# Patient Record
Sex: FEMALE | Race: WHITE | NOT HISPANIC OR LATINO | Employment: OTHER | URBAN - METROPOLITAN AREA
[De-identification: names, ages, dates, MRNs, and addresses within clinical notes are randomized per-mention and may not be internally consistent; named-entity substitution may affect disease eponyms.]

---

## 2018-03-30 ENCOUNTER — APPOINTMENT (OUTPATIENT)
Dept: WOMENS IMAGING | Facility: HOSPITAL | Age: 63
End: 2018-03-30

## 2018-03-30 PROCEDURE — 77067 SCR MAMMO BI INCL CAD: CPT | Performed by: RADIOLOGY

## 2021-01-15 ENCOUNTER — OFFICE VISIT (OUTPATIENT)
Dept: CARDIOLOGY | Facility: CLINIC | Age: 66
End: 2021-01-15

## 2021-01-15 VITALS
WEIGHT: 206 LBS | BODY MASS INDEX: 36.5 KG/M2 | DIASTOLIC BLOOD PRESSURE: 68 MMHG | SYSTOLIC BLOOD PRESSURE: 110 MMHG | TEMPERATURE: 97.7 F | HEIGHT: 63 IN | HEART RATE: 57 BPM | OXYGEN SATURATION: 97 %

## 2021-01-15 DIAGNOSIS — I47.29 VENTRICULAR TACHYCARDIA (PAROXYSMAL) (HCC): Primary | ICD-10-CM

## 2021-01-15 DIAGNOSIS — E78.5 HYPERLIPIDEMIA LDL GOAL <100: ICD-10-CM

## 2021-01-15 PROBLEM — I47.20 VENTRICULAR TACHYCARDIA (PAROXYSMAL): Status: ACTIVE | Noted: 2021-01-15

## 2021-01-15 PROCEDURE — 93000 ELECTROCARDIOGRAM COMPLETE: CPT | Performed by: INTERNAL MEDICINE

## 2021-01-15 PROCEDURE — 99214 OFFICE O/P EST MOD 30 MIN: CPT | Performed by: INTERNAL MEDICINE

## 2021-01-15 RX ORDER — METOPROLOL TARTRATE 50 MG/1
50 TABLET, FILM COATED ORAL 2 TIMES DAILY
Qty: 180 TABLET | Refills: 3 | Status: SHIPPED | OUTPATIENT
Start: 2021-01-15 | End: 2022-02-21

## 2021-01-15 NOTE — PROGRESS NOTES
[facility]  [facility] CARDIOLOGY  [address] [place] [address]  Dept: [phone]  Dept Fax: [phone]    Katie Nicolas  1955    Follow Up Office Visit Note    History of Present Illness:  Katie Nicolas is a 65 y.o. female who presents to the clinic for follow up of ventricular tachycardia - She is s/PLAN: ablation over 10 years ago, no complaints on Metoprolol 50 mg bid, no complaints    The following portions of the patient's history were reviewed and updated as appropriate: allergies, current medications, past family history, past medical history, past social history, past surgical history and problem list.    Medications:  ascorbic acid  aspirin  atorvastatin  Biotin capsule  calcium carbonate  Cholecalciferol  clonazePAM  folic acid  metoprolol tartrate  MILK THISTLE EXTRACT PO  omeprazole  Vitamin B Complex tablet  Xeljanz tablet    Subjective  Allergies   Allergen Reactions   • Niacin Nausea And Vomiting        Past Medical History:   Diagnosis Date   • Anxiety    • Benign essential hypertension    • Chest pain, unspecified    • Depressive disorder    • Diverticulosis     noted on CT scan for Kidney stones   • GERD without esophagitis    • Hyperlipidemia, unspecified    • Kidney stones     other episodes too   • Paroxysmal supraventricular tachycardia (CMS/HCC)     bypass tract ablation   • Rheumatoid arthritis (CMS/HCC)     meds   • Sleep apnea 2006    cpap   • Ventricular tachycardia (CMS/HCC)     The patient visits the office for an evaluationof ventricular tachcardia. Additional  information: No complaints, s/p ablation.       Past Surgical History:   Procedure Laterality Date   •  SECTION         Family History   Problem Relation Age of Onset   • Heart disease Father    • Obesity Other         Social History     Socioeconomic History   • Marital status: Single     Spouse name: Not on file   • Number of children: Not on file   • Years of education:  "Not on file   • Highest education level: Not on file   Tobacco Use   • Smoking status: Never Smoker   • Tobacco comment: UNKNOWN       Review of Systems   Constitutional: Negative.    HENT: Negative.    Respiratory: Negative.    Cardiovascular: Negative.    Endocrine: Negative.    Genitourinary: Negative.    Musculoskeletal: Negative.    Skin: Negative.    Allergic/Immunologic: Negative.    Neurological: Negative.    Hematological: Negative.    Psychiatric/Behavioral: Negative.        Cardiovascular Procedures    ECHO/MUGA: ECHOCARDIOGRAM - SCAN - ECHO-08- (01/13/2021)    STRESS TESTS:   CARDIAC CATH:   DEVICES:   HOLTER:   CT/MRI:   VASCULAR:   CARDIOTHORACIC:     Objective  Vitals:    01/15/21 1524   Pulse: 57   Temp: 97.7 °F (36.5 °C)   TempSrc: Infrared   SpO2: 97%   Weight: 93.4 kg (206 lb)   Height: 160 cm (63\")   PainSc: 0-No pain     Body mass index is 36.49 kg/m².     Physical Exam  Constitutional:       Appearance: Healthy appearance. Not in distress.   Neck:      Vascular: No JVR. JVD normal.   Pulmonary:      Effort: Pulmonary effort is normal.      Breath sounds: Normal breath sounds. No wheezing. No rhonchi. No rales.   Chest:      Chest wall: Not tender to palpatation.   Cardiovascular:      PMI at left midclavicular line. Normal rate. Regular rhythm. Normal S1. Normal S2.      Murmurs: There is no murmur.      No gallop. No click. No rub.   Pulses:     Intact distal pulses.   Edema:     Peripheral edema absent.   Abdominal:      General: Bowel sounds are normal.      Palpations: Abdomen is soft.      Tenderness: There is no abdominal tenderness.   Musculoskeletal: Normal range of motion.         General: No tenderness.   Skin:     General: Skin is warm and dry.   Neurological:      General: No focal deficit present.      Mental Status: Alert and oriented to person, place and time.          Diagnostic Data    ECG 12 Lead    Date/Time: 1/15/2021 3:45 PM  Performed by: Héctor Dumont, " MD  Authorized by: Héctor Dumont MD   Comparison: compared with previous ECG from 10/28/2019  Similar to previous ECG  Rhythm: sinus rhythm  Rate: normal  Conduction: right bundle branch block  QRS axis: normal              Assessment and Plan  Ventricular tachycardia - Asymptomatic on Metoprolol , she is s/p ablation for VT RVOT in 1999  Hyperlipidemia - was recently started on Lipitor     No follow-ups on file.    Héctor Dumont MD  01/15/2021

## 2021-03-22 ENCOUNTER — BULK ORDERING (OUTPATIENT)
Dept: CASE MANAGEMENT | Facility: OTHER | Age: 66
End: 2021-03-22

## 2021-03-22 DIAGNOSIS — Z23 IMMUNIZATION DUE: ICD-10-CM

## 2021-07-16 ENCOUNTER — OFFICE VISIT (OUTPATIENT)
Dept: CARDIOLOGY | Facility: CLINIC | Age: 66
End: 2021-07-16

## 2021-07-16 VITALS
DIASTOLIC BLOOD PRESSURE: 74 MMHG | HEART RATE: 88 BPM | RESPIRATION RATE: 11 BRPM | TEMPERATURE: 97 F | HEIGHT: 62 IN | WEIGHT: 211 LBS | OXYGEN SATURATION: 98 % | BODY MASS INDEX: 38.83 KG/M2 | SYSTOLIC BLOOD PRESSURE: 132 MMHG

## 2021-07-16 DIAGNOSIS — I47.29 VENTRICULAR TACHYCARDIA (PAROXYSMAL) (HCC): Primary | ICD-10-CM

## 2021-07-16 DIAGNOSIS — E78.5 HYPERLIPIDEMIA LDL GOAL <100: ICD-10-CM

## 2021-07-16 PROCEDURE — 93000 ELECTROCARDIOGRAM COMPLETE: CPT | Performed by: INTERNAL MEDICINE

## 2021-07-16 PROCEDURE — 99214 OFFICE O/P EST MOD 30 MIN: CPT | Performed by: INTERNAL MEDICINE

## 2021-07-16 RX ORDER — MELATONIN 200 MCG
TABLET ORAL
COMMUNITY

## 2021-07-16 RX ORDER — NAPROXEN 375 MG/1
375 TABLET ORAL DAILY
COMMUNITY
End: 2022-07-15

## 2021-07-16 NOTE — PROGRESS NOTES
[facility]  [facility] CARDIOLOGY  [address] [place] [address]  Dept: [phone]  Dept Fax: [phone]    Katie Nicolas  1955    Follow Up Office Visit Note    History of Present Illness:  Katie Nicolas is a 65 y.o. female who presents to the clinic for Follow-up. Ventricular tachycardia - She seems doing well, no complaints, s.p Ablation, on Metoprolol 50 mg bid,     The following portions of the patient's history were reviewed and updated as appropriate: allergies, current medications, past family history, past medical history, past social history, past surgical history and problem list.    Medications:  ascorbic acid  aspirin  atorvastatin  Biotin capsule  calcium carbonate  Cholecalciferol  clonazePAM  folic acid  Melatonin tablet  metoprolol tartrate  MILK THISTLE EXTRACT PO  naproxen  omeprazole  Vitamin B Complex tablet  Xeljanz tablet    Subjective  Allergies   Allergen Reactions   • Niacin Nausea And Vomiting        Past Medical History:   Diagnosis Date   • Anxiety    • Benign essential hypertension    • Chest pain, unspecified    • Depressive disorder    • Diverticulosis     noted on CT scan for Kidney stones   • GERD without esophagitis    • Hyperlipidemia, unspecified    • Kidney stones     other episodes too   • Paroxysmal supraventricular tachycardia (CMS/HCC)     bypass tract ablation   • Rheumatoid arthritis (CMS/HCC)     meds   • Sleep apnea     cpap   • Ventricular tachycardia (CMS/HCC)     The patient visits the office for an evaluationof ventricular tachcardia. Additional  information: No complaints, s/p ablation.       Past Surgical History:   Procedure Laterality Date   •  SECTION         Family History   Problem Relation Age of Onset   • Heart disease Father    • Obesity Other         Social History     Socioeconomic History   • Marital status: Single     Spouse name: Not on file   • Number of children: Not on file   • Years of  "education: Not on file   • Highest education level: Not on file   Tobacco Use   • Smoking status: Never Smoker   • Smokeless tobacco: Never Used   • Tobacco comment: UNKNOWN   Vaping Use   • Vaping Use: Never used   Substance and Sexual Activity   • Alcohol use: Not Currently   • Drug use: Never   • Sexual activity: Defer       Review of Systems   Constitutional: Negative.    HENT: Negative.    Respiratory: Negative.    Cardiovascular: Negative.    Endocrine: Negative.    Genitourinary: Negative.    Musculoskeletal: Negative.    Skin: Negative.    Allergic/Immunologic: Negative.    Neurological: Negative.    Hematological: Negative.    Psychiatric/Behavioral: Negative.    All other systems reviewed and are negative.      Cardiovascular Procedures    ECHO/MUGA:   STRESS TESTS:   CARDIAC CATH:   DEVICES:   HOLTER:   CT/MRI:   VASCULAR:   CARDIOTHORACIC:     Objective  Vitals:    07/16/21 1357   BP: 132/74   BP Location: Right arm   Patient Position: Sitting   Cuff Size: Adult   Pulse: 88   Resp: 11   Temp: 97 °F (36.1 °C)   TempSrc: Infrared   SpO2: 98%   Weight: 95.7 kg (211 lb)   Height: 157.5 cm (62\")   PainSc: 0-No pain     Body mass index is 38.59 kg/m².     Physical Exam  Constitutional:       Appearance: Healthy appearance. Not in distress.   Neck:      Vascular: No JVR. JVD normal.   Pulmonary:      Effort: Pulmonary effort is normal.      Breath sounds: Normal breath sounds. No wheezing. No rhonchi. No rales.   Chest:      Chest wall: Not tender to palpatation.   Cardiovascular:      PMI at left midclavicular line. Normal rate. Regular rhythm. Normal S1. Normal S2.      Murmurs: There is no murmur.      No gallop. No click. No rub.   Pulses:     Intact distal pulses.   Edema:     Peripheral edema absent.   Abdominal:      General: Bowel sounds are normal.      Palpations: Abdomen is soft.      Tenderness: There is no abdominal tenderness.   Musculoskeletal: Normal range of motion.         General: No " tenderness. Skin:     General: Skin is warm and dry.   Neurological:      General: No focal deficit present.      Mental Status: Alert and oriented to person, place and time.          Diagnostic Data    ECG 12 Lead    Date/Time: 7/16/2021 2:30 PM  Performed by: Héctor Dumont MD  Authorized by: Héctor Dumont MD   Comparison: compared with previous ECG   Similar to previous ECG  Rhythm: sinus rhythm  Rate: normal  BPM: 66  Conduction: right bundle branch block  QRS axis: normal    Clinical impression: abnormal EKG            Assessment and Plan  Diagnoses and all orders for this visit:    Ventricular tachycardia (paroxysmal) (CMS/HCC)-S.p ablation, no complaints on metoprolol 25 bid    Hyperlipidemia LDL goal <100 On Lipitor     Other orders  -     naproxen (NAPROSYN) 375 MG tablet; Take 375 mg by mouth 2 (Two) Times a Day As Needed for Mild Pain .  -     Melatonin 200 MCG tablet; Take  by mouth.         No follow-ups on file.    Héctor Dumont MD  07/16/2021

## 2022-01-11 DIAGNOSIS — I47.29 VENTRICULAR TACHYCARDIA (PAROXYSMAL): ICD-10-CM

## 2022-01-11 RX ORDER — METOPROLOL TARTRATE 50 MG/1
TABLET, FILM COATED ORAL
Qty: 180 TABLET | Refills: 3 | OUTPATIENT
Start: 2022-01-11

## 2022-02-20 DIAGNOSIS — I47.29 VENTRICULAR TACHYCARDIA (PAROXYSMAL): ICD-10-CM

## 2022-02-21 RX ORDER — METOPROLOL TARTRATE 50 MG/1
TABLET, FILM COATED ORAL
Qty: 180 TABLET | Refills: 1 | Status: SHIPPED | OUTPATIENT
Start: 2022-02-21 | End: 2022-08-19

## 2022-02-22 ENCOUNTER — TELEPHONE (OUTPATIENT)
Dept: CARDIOLOGY | Facility: CLINIC | Age: 67
End: 2022-02-22

## 2022-04-18 ENCOUNTER — TELEPHONE (OUTPATIENT)
Dept: CARDIOLOGY | Facility: CLINIC | Age: 67
End: 2022-04-18

## 2022-04-18 ENCOUNTER — OFFICE VISIT (OUTPATIENT)
Dept: CARDIOLOGY | Facility: CLINIC | Age: 67
End: 2022-04-18

## 2022-04-18 VITALS
HEART RATE: 62 BPM | DIASTOLIC BLOOD PRESSURE: 96 MMHG | HEIGHT: 62 IN | OXYGEN SATURATION: 98 % | TEMPERATURE: 96 F | RESPIRATION RATE: 18 BRPM | WEIGHT: 211 LBS | SYSTOLIC BLOOD PRESSURE: 160 MMHG | BODY MASS INDEX: 38.83 KG/M2

## 2022-04-18 DIAGNOSIS — I47.29 VENTRICULAR TACHYCARDIA (PAROXYSMAL): Primary | ICD-10-CM

## 2022-04-18 DIAGNOSIS — E78.5 HYPERLIPIDEMIA LDL GOAL <100: ICD-10-CM

## 2022-04-18 DIAGNOSIS — R07.9 CHRONIC CHEST PAIN WITH LOW TO MODERATE RISK FOR CAD: ICD-10-CM

## 2022-04-18 DIAGNOSIS — G89.29 CHRONIC CHEST PAIN WITH LOW TO MODERATE RISK FOR CAD: ICD-10-CM

## 2022-04-18 DIAGNOSIS — I10 ESSENTIAL HYPERTENSION: ICD-10-CM

## 2022-04-18 DIAGNOSIS — Z91.89 CHRONIC CHEST PAIN WITH LOW TO MODERATE RISK FOR CAD: ICD-10-CM

## 2022-04-18 PROCEDURE — 99214 OFFICE O/P EST MOD 30 MIN: CPT | Performed by: INTERNAL MEDICINE

## 2022-04-18 RX ORDER — VALSARTAN 160 MG/1
160 TABLET ORAL DAILY
Qty: 30 TABLET | Refills: 2 | Status: SHIPPED | OUTPATIENT
Start: 2022-04-18 | End: 2022-05-02 | Stop reason: SDUPTHER

## 2022-04-18 NOTE — PROGRESS NOTES
[facility]  [facility] CARDIOLOGY  [address] [place] [address]  Dept: [phone]  Dept Fax: [phone]    Katie Nicolas  1955    Follow Up Office Visit Note    History of Present Illness:  Katie Nicolas is a 66 y.o. female who presents to the clinic for Follow-up. Chest pain AND Hypertension- Few days ago woke up and started having chest pain, retrosternal, dull , she check her BP and was elevated went to ER, EKG      Sinus no changes, Troponin negative, her BP was elevated,,  She was discharged from ER to follow up with us, she is not longer having any chest pain, , EKG sinus with  RBBB and LASHB, no changes,, her BP today is 145,80 , she takes Metoprolol for BP and also for VT, which she has had ablation times 2, in the 80 and also in the 90. Her cardiac exam is normal, will get a stress nuclear  To look for ischemia, , will add Valsartan 160 mg , will see her back in 2 weeks    The following portions of the patient's history were reviewed and updated as appropriate: allergies, current medications, past family history, past medical history, past surgical history and problem list.    Medications:  ascorbic acid  aspirin  atorvastatin  Biotin capsule  calcium carbonate  Cholecalciferol  clonazePAM  Melatonin tablet  metoprolol tartrate  naproxen  omeprazole  valsartan  Vitamin B Complex tablet  Xeljanz tablet    Subjective  Allergies   Allergen Reactions   • Niacin Nausea And Vomiting        Past Medical History:   Diagnosis Date   • Anxiety    • Benign essential hypertension    • Chest pain, unspecified    • Depressive disorder    • Diverticulosis 2005    noted on CT scan for Kidney stones   • GERD without esophagitis    • Hyperlipidemia, unspecified    • Kidney stones 2005    other episodes too   • Paroxysmal supraventricular tachycardia (HCC)     bypass tract ablation   • Rheumatoid arthritis (HCC)     meds   • Sleep apnea 2006    cpap   • Ventricular tachycardia  "([facility])     The patient visits the office for an evaluationof ventricular tachcardia. Additional  information: No complaints, s/p ablation.       Past Surgical History:   Procedure Laterality Date   •  SECTION         Family History   Problem Relation Age of Onset   • Heart disease Father    • Obesity Other         Social History     Socioeconomic History   • Marital status: Single   Tobacco Use   • Smoking status: Never Smoker   • Smokeless tobacco: Never Used   • Tobacco comment: UNKNOWN   Vaping Use   • Vaping Use: Never used   Substance and Sexual Activity   • Alcohol use: Not Currently   • Drug use: Never   • Sexual activity: Defer       Review of Systems   Constitutional: Negative.    HENT: Negative.    Respiratory: Negative.    Cardiovascular: Positive for chest pain.   Endocrine: Negative.    Genitourinary: Negative.    Musculoskeletal: Negative.    Skin: Negative.    Allergic/Immunologic: Negative.    Neurological: Negative.    Hematological: Negative.    Psychiatric/Behavioral: Negative.        Cardiovascular Procedures    ECHO/MUGA:   STRESS TESTS:   CARDIAC CATH:   DEVICES:   HOLTER:   CT/MRI:   VASCULAR:   CARDIOTHORACIC:     Objective  Vitals:    22 1442   BP: 160/96   BP Location: Left arm   Patient Position: Lying   Cuff Size: Adult   Pulse: 62   Resp: 18   Temp: 96 °F (35.6 °C)   TempSrc: Temporal   SpO2: 98%   Weight: 95.7 kg (211 lb)   Height: 157.5 cm (62\")   PainSc: 0-No pain     Body mass index is 38.59 kg/m².     Physical Exam  Constitutional:       Appearance: Healthy appearance. Not in distress.   Neck:      Vascular: No JVR. JVD normal.   Pulmonary:      Effort: Pulmonary effort is normal.      Breath sounds: Normal breath sounds. No wheezing. No rhonchi. No rales.   Chest:      Chest wall: Not tender to palpatation.   Cardiovascular:      PMI at left midclavicular line. Normal rate. Regular rhythm. Normal S1. Normal S2.      Murmurs: There is no murmur.      No gallop. No " click. No rub.   Pulses:     Intact distal pulses.   Edema:     Peripheral edema absent.   Abdominal:      General: Bowel sounds are normal.      Palpations: Abdomen is soft.      Tenderness: There is no abdominal tenderness.   Musculoskeletal: Normal range of motion.         General: No tenderness. Skin:     General: Skin is warm and dry.   Neurological:      General: No focal deficit present.      Mental Status: Alert and oriented to person, place and time.          Diagnostic Data  Procedures    Assessment and Plan  Diagnoses and all orders for this visit:    Ventricular tachycardia (paroxysmal) (HCC)- Has been asymptomatic for years, on Metoprolol 50 mg bid after times 2 ablation  -     Stress Test With Myocardial Perfusion One Day; Future    Hyperlipidemia LDL goal <100- on Lipitor, we need a Lipid profile    Essential hypertension- BP is 145.890, on Metoprolol 50 mg bid, will add Valsartan 160 mg    Chronic chest pain with low to moderate risk for CAD- She is 66, no smoker, no diabetes, got chest pain for first time with BP elevated, will get a treadmill Nuclear    Other orders  -     valsartan (DIOVAN) 160 MG tablet; Take 1 tablet by mouth Daily.         Return in about 2 weeks (around 5/2/2022).    Héctor Dumont MD  04/18/2022

## 2022-04-18 NOTE — TELEPHONE ENCOUNTER
Patient called because [facility] told her to make Dr. Dumont aware she had a cardiac event; bp 198/111 . An appointment was made in error for Jan because the follow up was meant to be 1 year from July's visit, so she's compliant. She is coming in for bp check, and I'm requesting records right now from [facility]. STAT RECORDS ARE BEING FAXED NOW.

## 2022-04-25 ENCOUNTER — OFFICE VISIT (OUTPATIENT)
Dept: FAMILY MEDICINE CLINIC | Facility: CLINIC | Age: 67
End: 2022-04-25

## 2022-04-25 VITALS
SYSTOLIC BLOOD PRESSURE: 126 MMHG | WEIGHT: 203 LBS | BODY MASS INDEX: 37.36 KG/M2 | OXYGEN SATURATION: 96 % | HEIGHT: 62 IN | DIASTOLIC BLOOD PRESSURE: 80 MMHG | HEART RATE: 74 BPM

## 2022-04-25 DIAGNOSIS — F41.9 ANXIETY: ICD-10-CM

## 2022-04-25 DIAGNOSIS — I10 ESSENTIAL HYPERTENSION: Primary | ICD-10-CM

## 2022-04-25 DIAGNOSIS — R21 RASH AND NONSPECIFIC SKIN ERUPTION: ICD-10-CM

## 2022-04-25 PROCEDURE — 99214 OFFICE O/P EST MOD 30 MIN: CPT | Performed by: FAMILY MEDICINE

## 2022-04-25 RX ORDER — CLONAZEPAM 1 MG/1
1 TABLET ORAL 2 TIMES DAILY PRN
Qty: 60 TABLET | Refills: 2 | Status: SHIPPED | OUTPATIENT
Start: 2022-04-25 | End: 2022-08-01 | Stop reason: SDUPTHER

## 2022-04-25 NOTE — PROGRESS NOTES
Follow Up Office Visit      Date of Visit:  2022   Patient Name: Katie Nicolas  : 1955   MRN: [id number]     Chief Complaint:    Chief Complaint   Patient presents with   • Hypertension     ER visit due to hypertension, has seen Dr Dumont since the ER visit   • Med Refill       History of Present Illness: Katie Nicolas is a 66 y.o. female who is here today for follow up.    Hypertension  This is a chronic problem. The problem has been waxing and waning since onset. The problem is uncontrolled. Associated symptoms include anxiety. Pertinent negatives include no chest pain or shortness of breath. Risk factors for coronary artery disease include sedentary lifestyle, dyslipidemia and obesity. Past treatments include angiotensin blockers and beta blockers. The current treatment provides mild improvement. Compliance problems include psychosocial issues.    Anxiety  Presents for follow-up visit. Patient reports no chest pain, depressed mood, nausea or shortness of breath. Symptoms occur constantly. The severity of symptoms is moderate. The quality of sleep is good.     Compliance with medications is %.         Subjective      Review of Systems:   Review of Systems   Constitutional: Negative for fatigue and fever.   HENT: Negative for congestion and ear pain.    Respiratory: Negative for apnea, cough, chest tightness and shortness of breath.    Cardiovascular: Negative for chest pain.   Gastrointestinal: Negative for abdominal pain, constipation, diarrhea and nausea.   Musculoskeletal: Negative for arthralgias.   Psychiatric/Behavioral: Negative for depressed mood and stress.       Past Medical History:   Past Medical History:   Diagnosis Date   • Anxiety    • Benign essential hypertension    • Chest pain, unspecified    • Depressive disorder    • Diverticulosis     noted on CT scan for Kidney stones   • GERD without esophagitis    • Hyperlipidemia, unspecified    • Kidney stones      other episodes too   • Paroxysmal supraventricular tachycardia (HCC)     bypass tract ablation   • Rheumatoid arthritis (HCC)     meds   • Sleep apnea 2006    cpap   • Ventricular tachycardia (HCC)     The patient visits the office for an evaluationof ventricular tachcardia. Additional  information: No complaints, s/p ablation.       Past Surgical History:   Past Surgical History:   Procedure Laterality Date   •  SECTION         Family History:   Family History   Problem Relation Age of Onset   • Heart disease Father    • Obesity Other        Social History:   Social History     Socioeconomic History   • Marital status: Single   Tobacco Use   • Smoking status: Never Smoker   • Smokeless tobacco: Never Used   • Tobacco comment: UNKNOWN   Vaping Use   • Vaping Use: Never used   Substance and Sexual Activity   • Alcohol use: Not Currently   • Drug use: Never   • Sexual activity: Defer       Medications:     Current Outpatient Medications:   •  aspirin 81 MG EC tablet, Take 81 mg by mouth Daily. Take one tablet by oral route daily, Disp: , Rfl:   •  atorvastatin (LIPITOR) 10 MG tablet, Take 10 mg by mouth Daily. Take one tablet by oral route daily, Disp: , Rfl:   •  B Complex Vitamins (Vitamin B Complex) tablet, Take  by mouth Daily. Take one tablet by oral route daily, Disp: , Rfl:   •  Biotin 10 MG capsule, Take  by mouth. Take one tablet by oral route daily, Disp: , Rfl:   •  calcium carbonate (OS-RYANNE) 1250 (500 Ca) MG tablet, Take 1 tablet by mouth Daily. Take one tablet by oral route daily;, Disp: , Rfl:   •  clonazePAM (KlonoPIN) 1 MG tablet, Take 1 mg by mouth 2 (Two) Times a Day As Needed. Take two tablets by oral route daily at bedtime, Disp: , Rfl:   •  Melatonin 200 MCG tablet, Take  by mouth., Disp: , Rfl:   •  metoprolol tartrate (LOPRESSOR) 50 MG tablet, TAKE ONE TABLET BY MOUTH TWICE A DAY, Disp: 180 tablet, Rfl: 1  •  MILK THISTLE PO, Take  by mouth., Disp: , Rfl:   •  naproxen (NAPROSYN) 375 MG  "tablet, Take 375 mg by mouth Daily., Disp: , Rfl:   •  omeprazole (priLOSEC) 40 MG capsule, Take 40 mg by mouth Daily. DAILY BEFORE A SINGLE MEAL, Disp: , Rfl:   •  Tofacitinib Citrate (Xeljanz) 5 MG tablet, Take 11 mg by mouth Daily., Disp: , Rfl:   •  valsartan (DIOVAN) 160 MG tablet, Take 1 tablet by mouth Daily., Disp: 30 tablet, Rfl: 2  •  ascorbic acid (VITAMIN C) 500 MG tablet, Take 500 mg by mouth Daily. Take one tablet by oral route daily, Disp: , Rfl:   •  Cholecalciferol 25 MCG (1000 UT) tablet, Take 1,000 Units by mouth Daily. Take one tablet by oral route daily, Disp: , Rfl:     Allergies:   Allergies   Allergen Reactions   • Niacin Nausea And Vomiting       Objective     Physical Exam:  Vital Signs:   Vitals:    04/25/22 1315   BP: 126/80   Pulse: 74   SpO2: 96%   Weight: 92.1 kg (203 lb)   Height: 157.5 cm (62\")     Body mass index is 37.13 kg/m².     Physical Exam  Vitals and nursing note reviewed.   Constitutional:       General: She is not in acute distress.     Appearance: Normal appearance. She is not ill-appearing.   HENT:      Head: Normocephalic and atraumatic.      Right Ear: Tympanic membrane and ear canal normal.      Left Ear: Tympanic membrane and ear canal normal.      Nose: Nose normal.   Cardiovascular:      Rate and Rhythm: Normal rate and regular rhythm.      Heart sounds: Normal heart sounds.   Pulmonary:      Effort: Pulmonary effort is normal.      Breath sounds: Normal breath sounds.   Neurological:      Mental Status: She is alert and oriented to person, place, and time. Mental status is at baseline.   Psychiatric:         Mood and Affect: Mood normal.         Procedures    BMI is above normal parameters. Recommendations: exercise counseling/recommendations and nutrition counseling/recommendations        Assessment / Plan      Assessment/Plan:   Diagnoses and all orders for this visit:    1. Essential hypertension (Primary)    2. Anxiety    3. Rash and nonspecific skin " eruption  -     Ambulatory Referral to Dermatology         Refill anxiety med.   edin report reviewed.  Will cont meds from cardiology and they can adjust meds for the HTN.   Stress test looks look.   Will also make appt for derm for some nonspecific skin lesions on her legs.      Follow Up:   Return in about 3 months (around 7/25/2022) for Recheck, Annual physical, Annual Wellness-Provider.    Kalen Leslie  [facility]

## 2022-05-02 ENCOUNTER — OFFICE VISIT (OUTPATIENT)
Dept: CARDIOLOGY | Facility: CLINIC | Age: 67
End: 2022-05-02

## 2022-05-02 VITALS
RESPIRATION RATE: 18 BRPM | TEMPERATURE: 98 F | BODY MASS INDEX: 38.09 KG/M2 | SYSTOLIC BLOOD PRESSURE: 166 MMHG | HEIGHT: 62 IN | OXYGEN SATURATION: 98 % | HEART RATE: 84 BPM | DIASTOLIC BLOOD PRESSURE: 86 MMHG | WEIGHT: 207 LBS

## 2022-05-02 DIAGNOSIS — I10 ESSENTIAL HYPERTENSION: ICD-10-CM

## 2022-05-02 DIAGNOSIS — Z91.89 CHRONIC CHEST PAIN WITH LOW TO MODERATE RISK FOR CAD: ICD-10-CM

## 2022-05-02 DIAGNOSIS — I47.29 VENTRICULAR TACHYCARDIA (PAROXYSMAL): Primary | ICD-10-CM

## 2022-05-02 DIAGNOSIS — E78.5 HYPERLIPIDEMIA LDL GOAL <100: ICD-10-CM

## 2022-05-02 DIAGNOSIS — G89.29 CHRONIC CHEST PAIN WITH LOW TO MODERATE RISK FOR CAD: ICD-10-CM

## 2022-05-02 DIAGNOSIS — R07.9 CHRONIC CHEST PAIN WITH LOW TO MODERATE RISK FOR CAD: ICD-10-CM

## 2022-05-02 PROCEDURE — 99214 OFFICE O/P EST MOD 30 MIN: CPT | Performed by: INTERNAL MEDICINE

## 2022-05-02 RX ORDER — VALSARTAN 320 MG/1
320 TABLET ORAL DAILY
Qty: 90 TABLET | Refills: 2 | Status: SHIPPED | OUTPATIENT
Start: 2022-05-02 | End: 2023-01-18 | Stop reason: SDUPTHER

## 2022-05-02 NOTE — PROGRESS NOTES
[facility] [place]  [facility] CARDIOLOGY  [address] [place] [address]  Dept: [phone]  Dept Fax: [phone]    Katie Nicolas  1955    Follow Up Office Visit Note    History of Present Illness:  Katie Nicolas is a 66 y.o. female who presents to the clinic for Follow-up. Chest pain- No longer has chest pain, stress nuclear normal . , will observe    The following portions of the patient's history were reviewed and updated as appropriate: allergies, current medications, past family history, past medical history, past social history, past surgical history and problem list.    Medications:  ascorbic acid  aspirin  atorvastatin  Biotin capsule  calcium carbonate  Cholecalciferol  clonazePAM  Melatonin tablet  metoprolol tartrate  MILK THISTLE PO  naproxen  omeprazole  valsartan  Vitamin B Complex tablet  Xeljanz tablet    Subjective  Allergies   Allergen Reactions   • Niacin Nausea And Vomiting        Past Medical History:   Diagnosis Date   • Anxiety    • Benign essential hypertension    • Chest pain, unspecified    • Depressive disorder    • Diverticulosis     noted on CT scan for Kidney stones   • GERD without esophagitis    • Hyperlipidemia, unspecified    • Kidney stones     other episodes too   • Paroxysmal supraventricular tachycardia (HCC)     bypass tract ablation   • Rheumatoid arthritis (HCC)     meds   • Sleep apnea 2006    cpap   • Ventricular tachycardia (HCC)     The patient visits the office for an evaluationof ventricular tachcardia. Additional  information: No complaints, s/p ablation.       Past Surgical History:   Procedure Laterality Date   •  SECTION         Family History   Problem Relation Age of Onset   • Heart disease Father    • Obesity Other         Social History     Socioeconomic History   • Marital status: Single   Tobacco Use   • Smoking status: Never Smoker   • Smokeless tobacco: Never Used   • Tobacco comment: UNKNOWN   Vaping  "Use   • Vaping Use: Never used   Substance and Sexual Activity   • Alcohol use: Not Currently   • Drug use: Never   • Sexual activity: Defer       Review of Systems   Constitutional: Negative.    HENT: Negative.    Respiratory: Negative.    Cardiovascular: Negative.    Endocrine: Negative.    Genitourinary: Negative.    Musculoskeletal: Negative.    Skin: Negative.    Allergic/Immunologic: Negative.    Neurological: Negative.    Hematological: Negative.    Psychiatric/Behavioral: Negative.        Cardiovascular Procedures    ECHO/MUGA:   STRESS TESTS:   CARDIAC CATH:   DEVICES:   HOLTER:   CT/MRI:   VASCULAR:   CARDIOTHORACIC:     Objective  Vitals:    05/02/22 1328   BP: 166/86   BP Location: Left arm   Patient Position: Sitting   Cuff Size: Adult   Pulse: 84   Resp: 18   Temp: 98 °F (36.7 °C)   TempSrc: Infrared   SpO2: 98%   Weight: 93.9 kg (207 lb)   Height: 157.5 cm (62\")   PainSc: 0-No pain     Body mass index is 37.86 kg/m².     Physical Exam  Constitutional:       Appearance: Healthy appearance. Not in distress.   Neck:      Vascular: No JVR. JVD normal.   Pulmonary:      Effort: Pulmonary effort is normal.      Breath sounds: Normal breath sounds. No wheezing. No rhonchi. No rales.   Chest:      Chest wall: Not tender to palpatation.   Cardiovascular:      PMI at left midclavicular line. Normal rate. Regular rhythm. Normal S1. Normal S2.      Murmurs: There is no murmur.      No gallop. No click. No rub.   Pulses:     Intact distal pulses.   Edema:     Peripheral edema absent.   Abdominal:      General: Bowel sounds are normal.      Palpations: Abdomen is soft.      Tenderness: There is no abdominal tenderness.   Musculoskeletal: Normal range of motion.         General: No tenderness. Skin:     General: Skin is warm and dry.   Neurological:      General: No focal deficit present.      Mental Status: Alert and oriented to person, place and time.          Diagnostic Data  Procedures    Assessment and " Plan  Diagnoses and all orders for this visit:    Ventricular tachycardia (paroxysmal) (HCC)- s.p ablation., no complaints    Hyperlipidemia LDL goal <100- On Lipitor 10 mg    Essential hypertension- the Bp is still high 145.80 better, but high, after we add Valsartan 160 mg to Metoprolol, will increase furter to 320 mg, will let us know if the SBP is still higher than 140 in 2 weeks      Chronic chest pain with low to moderate risk for CAD- Stress negative, no longer has chest pain    Other orders  -     valsartan (DIOVAN) 320 MG tablet; Take 1 tablet by mouth Daily.         Return in about 3 months (around 8/2/2022) for Recheck.    Héctor Dumont MD  05/02/2022

## 2022-05-11 ENCOUNTER — OFFICE VISIT (OUTPATIENT)
Dept: FAMILY MEDICINE CLINIC | Facility: CLINIC | Age: 67
End: 2022-05-11

## 2022-05-11 VITALS
BODY MASS INDEX: 36.14 KG/M2 | SYSTOLIC BLOOD PRESSURE: 138 MMHG | HEART RATE: 78 BPM | OXYGEN SATURATION: 98 % | WEIGHT: 204 LBS | TEMPERATURE: 97.6 F | HEIGHT: 63 IN | DIASTOLIC BLOOD PRESSURE: 84 MMHG

## 2022-05-11 DIAGNOSIS — R05.9 COUGH: Primary | ICD-10-CM

## 2022-05-11 DIAGNOSIS — K21.9 GASTROESOPHAGEAL REFLUX DISEASE, UNSPECIFIED WHETHER ESOPHAGITIS PRESENT: ICD-10-CM

## 2022-05-11 PROCEDURE — 99214 OFFICE O/P EST MOD 30 MIN: CPT | Performed by: PHYSICIAN ASSISTANT

## 2022-05-11 NOTE — PROGRESS NOTES
"Chief Complaint  Wheezing and Heartburn (C/o heartburn and nausea. )    Subjective          Katie Nicolas presents to [facility]  Patient in today for eval on mild wheeze that her rheumatologist heard yesterday. She states has had occasional cough and keeps her normal allergy/sinus congestion- nothing out of the ordinary for her. Denies any fever. No vomiting or diarrhea. She has had more noticeable reflux symptoms for past 3+ weeks. She went to ER 2 weeks ago for chest pain- states acute cardiac workup was negative and has since f/up with her cardiologist and had stress test done.  She currently denies any chest pain or shortness of breath. States her bp has been improving over past few weeks since medication adjustments were made.     Wheezing   This is a new problem. The current episode started yesterday. Associated symptoms include coughing. Pertinent negatives include no abdominal pain, chest pain, diarrhea, shortness of breath, sore throat or vomiting. There is no history of asthma.   Heartburn  She complains of coughing and wheezing. She reports no abdominal pain, no chest pain, no nausea or no sore throat. Pertinent negatives include no fatigue.       Objective   Vital Signs:   /98   Pulse 78   Temp 97.6 °F (36.4 °C) (Oral)   Ht 158.8 cm (62.5\")   Wt 92.5 kg (204 lb)   SpO2 98%   BMI 36.72 kg/m²     Body mass index is 36.72 kg/m².    Review of Systems   Constitutional: Negative for fatigue.   HENT: Positive for congestion. Negative for sore throat.    Respiratory: Positive for cough and wheezing. Negative for shortness of breath.    Cardiovascular: Negative for chest pain, palpitations and leg swelling.   Gastrointestinal: Negative for abdominal pain, diarrhea, nausea and vomiting.   Neurological: Negative for dizziness and headache.       Past History:  Medical History: has a past medical history of Anxiety, Benign essential hypertension, Chest pain, unspecified, " Depressive disorder, Diverticulosis (), GERD without esophagitis, Hyperlipidemia, unspecified, Kidney stones (), Paroxysmal supraventricular tachycardia (HCC), Rheumatoid arthritis (HCC), Sleep apnea (2006), and Ventricular tachycardia (HCC).   Surgical History: has a past surgical history that includes  section.   Family History: family history includes Heart disease in her father; Obesity in an other family member.   Social History: reports that she has never smoked. She has never used smokeless tobacco. She reports current alcohol use. She reports that she does not use drugs.      Current Outpatient Medications:   •  ascorbic acid (VITAMIN C) 500 MG tablet, Take 500 mg by mouth Daily. Take one tablet by oral route daily, Disp: , Rfl:   •  aspirin 81 MG EC tablet, Take 81 mg by mouth Daily. Take one tablet by oral route daily, Disp: , Rfl:   •  atorvastatin (LIPITOR) 10 MG tablet, Take 10 mg by mouth Daily. Take one tablet by oral route daily, Disp: , Rfl:   •  B Complex Vitamins (Vitamin B Complex) tablet, Take  by mouth Daily. Take one tablet by oral route daily, Disp: , Rfl:   •  Biotin 10 MG capsule, Take  by mouth. Take one tablet by oral route daily, Disp: , Rfl:   •  calcium carbonate (OS-RYANNE) 1250 (500 Ca) MG tablet, Take 1 tablet by mouth Daily. Take one tablet by oral route daily;, Disp: , Rfl:   •  Cholecalciferol 25 MCG (1000 UT) tablet, Take 1,000 Units by mouth Daily. Take one tablet by oral route daily, Disp: , Rfl:   •  clonazePAM (KlonoPIN) 1 MG tablet, Take 1 tablet by mouth 2 (Two) Times a Day As Needed for Anxiety. Take two tablets by oral route daily at bedtime, Disp: 60 tablet, Rfl: 2  •  Melatonin 200 MCG tablet, Take  by mouth., Disp: , Rfl:   •  metoprolol tartrate (LOPRESSOR) 50 MG tablet, TAKE ONE TABLET BY MOUTH TWICE A DAY, Disp: 180 tablet, Rfl: 1  •  MILK THISTLE PO, Take  by mouth., Disp: , Rfl:   •  naproxen (NAPROSYN) 375 MG tablet, Take 375 mg by mouth Daily.,  Disp: , Rfl:   •  omeprazole (priLOSEC) 40 MG capsule, Take 40 mg by mouth Daily. DAILY BEFORE A SINGLE MEAL, Disp: , Rfl:   •  Tofacitinib Citrate (Xeljanz) 5 MG tablet, Take 11 mg by mouth Daily., Disp: , Rfl:   •  valsartan (DIOVAN) 320 MG tablet, Take 1 tablet by mouth Daily., Disp: 90 tablet, Rfl: 2  Allergies: Niacin    Physical Exam  Constitutional:       Appearance: Normal appearance.   HENT:      Right Ear: Tympanic membrane normal.      Left Ear: Tympanic membrane normal.      Mouth/Throat:      Pharynx: Oropharynx is clear.   Eyes:      Conjunctiva/sclera: Conjunctivae normal.      Pupils: Pupils are equal, round, and reactive to light.   Cardiovascular:      Rate and Rhythm: Normal rate and regular rhythm.      Heart sounds: Normal heart sounds.   Pulmonary:      Effort: Pulmonary effort is normal. No respiratory distress.      Breath sounds: No rhonchi.      Comments: Faint exp wheeze heard once.   Abdominal:      Palpations: Abdomen is soft.      Tenderness: There is no abdominal tenderness.   Neurological:      Mental Status: She is oriented to person, place, and time.   Psychiatric:         Mood and Affect: Mood normal.         Behavior: Behavior normal.             Assessment and Plan   Diagnoses and all orders for this visit:    1. Cough (Primary)  -     XR Chest PA & Lateral (In Office)  Minimal faint wheeze heard once  on expiration.  Will check chest x-ray today.Discussed if symptoms persist, can send an inhaler to use as needed.  If notices any progression of symptoms return to clinic or ER if needed.  2. Gastroesophageal reflux disease, unspecified whether esophagitis present  With persistent and progressive complaints of reflux, recommend to get in with GI for further evaluation.  Patient states will consider and call back to get appointment scheduled.  Continue current medication and avoid aggravating foods and beverages.          Follow Up   Return if symptoms worsen or fail to  improve.  Patient was given instructions and counseling regarding her condition or for health maintenance advice. Please see specific information pulled into the AVS if appropriate.     Jessica Ryan PA-C

## 2022-05-13 RX ORDER — OMEPRAZOLE 40 MG/1
40 CAPSULE, DELAYED RELEASE ORAL DAILY
Qty: 90 CAPSULE | Refills: 0 | Status: SHIPPED | OUTPATIENT
Start: 2022-05-13 | End: 2022-08-09

## 2022-05-13 NOTE — TELEPHONE ENCOUNTER
Patient called requesting a medication refill on her prescription for omeprazole 40 MG. She would like a phone call back letting her know this request has been fulfilled ([phone]). Please advise.     The pharmacy is [facility] on [address] in [place].

## 2022-05-16 ENCOUNTER — TELEPHONE (OUTPATIENT)
Dept: FAMILY MEDICINE CLINIC | Facility: CLINIC | Age: 67
End: 2022-05-16

## 2022-05-16 NOTE — TELEPHONE ENCOUNTER
Please let know chest x-ray showed her lungs were clear.  Radiologist  incidentally  noted some multiple old right rib fractures.  Please let us know if symptoms persist.

## 2022-06-10 ENCOUNTER — TELEPHONE (OUTPATIENT)
Dept: CARDIOLOGY | Facility: CLINIC | Age: 67
End: 2022-06-10

## 2022-06-10 ENCOUNTER — TELEPHONE (OUTPATIENT)
Dept: FAMILY MEDICINE CLINIC | Facility: CLINIC | Age: 67
End: 2022-06-10

## 2022-06-10 ENCOUNTER — DOCUMENTATION (OUTPATIENT)
Dept: CARDIOLOGY | Facility: CLINIC | Age: 67
End: 2022-06-10

## 2022-06-10 RX ORDER — AMLODIPINE BESYLATE 5 MG/1
5 TABLET ORAL DAILY
Qty: 30 TABLET | Refills: 1 | Status: SHIPPED | OUTPATIENT
Start: 2022-06-10 | End: 2022-07-18

## 2022-06-10 RX ORDER — AMLODIPINE BESYLATE 5 MG/1
5 TABLET ORAL DAILY
Qty: 90 TABLET | Refills: 1 | Status: SHIPPED | OUTPATIENT
Start: 2022-06-10 | End: 2022-07-18 | Stop reason: ALTCHOICE

## 2022-06-10 RX ORDER — AMLODIPINE BESYLATE 5 MG/1
5 TABLET ORAL DAILY
COMMUNITY
End: 2022-06-10 | Stop reason: SDUPTHER

## 2022-06-10 NOTE — TELEPHONE ENCOUNTER
----- Message from Héctor Dumont MD sent at 6/10/2022  2:07 PM EDT -----  Regarding: BP  Please add Amlodipine 5mg daily  ----- Message -----  From: Madonna Mendez MA  Sent: 6/10/2022   1:26 PM EDT  To: Héctor Dumont MD

## 2022-06-10 NOTE — TELEPHONE ENCOUNTER
Left a message for Ms. Nicolas that Dr. Dumont looked at her BP reading and would like her to start Amlodipine medication for her BP.  You will take a 5mg tablet daily. Please keep us informed of how your BP is doing.

## 2022-06-10 NOTE — TELEPHONE ENCOUNTER
----- Message from Jessica Ryan PA-C sent at 5/11/2022 10:43 AM EDT -----  Please see if can get hospital records from [facility] from  2 weeks ago. Thanks .

## 2022-06-10 NOTE — TELEPHONE ENCOUNTER
Called pt and let her know Dr. Dumont wanted her to add Amlodipine 5mg once daily to her current medication regimen, I also advised her to continue to monitor her bp until her next appointment 7/18 pt verbally understood

## 2022-06-29 ENCOUNTER — TELEPHONE (OUTPATIENT)
Dept: CARDIOLOGY | Facility: CLINIC | Age: 67
End: 2022-06-29

## 2022-07-01 RX ORDER — ATORVASTATIN CALCIUM 10 MG/1
TABLET, FILM COATED ORAL
Qty: 90 TABLET | Refills: 0 | Status: SHIPPED | OUTPATIENT
Start: 2022-07-01 | End: 2022-09-30

## 2022-07-13 RX ORDER — VALSARTAN 160 MG/1
TABLET ORAL
Qty: 90 TABLET | Refills: 0 | OUTPATIENT
Start: 2022-07-13

## 2022-07-15 RX ORDER — NAPROXEN 375 MG/1
TABLET ORAL
Qty: 180 TABLET | Refills: 0 | Status: SHIPPED | OUTPATIENT
Start: 2022-07-15 | End: 2023-01-03

## 2022-07-18 ENCOUNTER — OFFICE VISIT (OUTPATIENT)
Dept: CARDIOLOGY | Facility: CLINIC | Age: 67
End: 2022-07-18

## 2022-07-18 VITALS
OXYGEN SATURATION: 99 % | DIASTOLIC BLOOD PRESSURE: 76 MMHG | BODY MASS INDEX: 35.26 KG/M2 | TEMPERATURE: 98 F | RESPIRATION RATE: 18 BRPM | HEART RATE: 65 BPM | SYSTOLIC BLOOD PRESSURE: 132 MMHG | WEIGHT: 199 LBS | HEIGHT: 63 IN

## 2022-07-18 DIAGNOSIS — G89.29 CHRONIC CHEST PAIN WITH LOW TO MODERATE RISK FOR CAD: ICD-10-CM

## 2022-07-18 DIAGNOSIS — R07.9 CHRONIC CHEST PAIN WITH LOW TO MODERATE RISK FOR CAD: ICD-10-CM

## 2022-07-18 DIAGNOSIS — Z91.89 CHRONIC CHEST PAIN WITH LOW TO MODERATE RISK FOR CAD: ICD-10-CM

## 2022-07-18 DIAGNOSIS — E78.5 HYPERLIPIDEMIA LDL GOAL <100: ICD-10-CM

## 2022-07-18 DIAGNOSIS — I10 ESSENTIAL HYPERTENSION: ICD-10-CM

## 2022-07-18 DIAGNOSIS — I47.29 VENTRICULAR TACHYCARDIA (PAROXYSMAL): Primary | ICD-10-CM

## 2022-07-18 PROCEDURE — 99214 OFFICE O/P EST MOD 30 MIN: CPT | Performed by: INTERNAL MEDICINE

## 2022-07-18 RX ORDER — TOFACITINIB 11 MG/1
TABLET, FILM COATED, EXTENDED RELEASE ORAL
COMMUNITY
Start: 2022-04-16

## 2022-07-18 NOTE — PROGRESS NOTES
[facility] [place]  [facility] CARDIOLOGY  [address] [place] [address]  Dept: [phone]  Dept Fax: [phone]    Katie Nicolas  1955    Follow Up Office Visit Note    History of Present Illness:  Katie Nicolas is a 66 y.o. female who presents to the clinic for Follow-up. Hypertension- She has lost some weight and also is exercising, her BP is much better at home and also here 100.60, will d.c Amlodipine 5 mg , will only keep valsartan 320 mg and Metoprolol 50 mg bid,     The following portions of the patient's history were reviewed and updated as appropriate: allergies, current medications, past family history, past medical history, past social history, past surgical history and problem list.    Medications:  ascorbic acid  atorvastatin  Biotin capsule  calcium carbonate  Cholecalciferol  clonazePAM  Melatonin tablet  metoprolol tartrate  MILK THISTLE PO  naproxen  omeprazole  valsartan  Vitamin B Complex tablet  Xeljanz XR tablet sustained-release 24 hour    Subjective  Allergies   Allergen Reactions   • Niacin Nausea And Vomiting        Past Medical History:   Diagnosis Date   • Anxiety    • Benign essential hypertension    • Chest pain, unspecified    • Depressive disorder    • Diverticulosis     noted on CT scan for Kidney stones   • GERD without esophagitis    • Hyperlipidemia, unspecified    • Kidney stones     other episodes too   • Paroxysmal supraventricular tachycardia (HCC)     bypass tract ablation   • Rheumatoid arthritis (HCC)     meds   • Sleep apnea 2006    cpap   • Ventricular tachycardia (HCC)     The patient visits the office for an evaluationof ventricular tachcardia. Additional  information: No complaints, s/p ablation.       Past Surgical History:   Procedure Laterality Date   •  SECTION         Family History   Problem Relation Age of Onset   • Heart disease Father    • Obesity Other         Social History     Socioeconomic History   •  "Marital status: Single   Tobacco Use   • Smoking status: Never Smoker   • Smokeless tobacco: Never Used   • Tobacco comment: UNKNOWN   Vaping Use   • Vaping Use: Never used   Substance and Sexual Activity   • Alcohol use: Yes     Comment: monthly   • Drug use: Never   • Sexual activity: Defer       Review of Systems   Constitutional: Negative.    HENT: Negative.    Respiratory: Negative.    Cardiovascular: Negative.    Endocrine: Negative.    Genitourinary: Negative.    Musculoskeletal: Negative.    Skin: Negative.    Allergic/Immunologic: Negative.    Neurological: Negative.    Hematological: Negative.    Psychiatric/Behavioral: Negative.        Cardiovascular Procedures    ECHO/MUGA:   STRESS TESTS:   CARDIAC CATH:   DEVICES:   HOLTER:   CT/MRI:   VASCULAR:   CARDIOTHORACIC:     Objective  Vitals:    07/18/22 1259   BP: 132/76   BP Location: Left arm   Patient Position: Sitting   Cuff Size: Adult   Pulse: 65   Resp: 18   Temp: 98 °F (36.7 °C)   TempSrc: Infrared   SpO2: 99%   Weight: 90.3 kg (199 lb)   Height: 158.8 cm (62.5\")   PainSc: 0-No pain     Body mass index is 35.82 kg/m².     Physical Exam  Constitutional:       Appearance: Healthy appearance. Not in distress.   Neck:      Vascular: No JVR. JVD normal.   Pulmonary:      Effort: Pulmonary effort is normal.      Breath sounds: Normal breath sounds. No wheezing. No rhonchi. No rales.   Chest:      Chest wall: Not tender to palpatation.   Cardiovascular:      PMI at left midclavicular line. Normal rate. Regular rhythm. Normal S1. Normal S2.      Murmurs: There is no murmur.      No gallop. No click. No rub.   Pulses:     Intact distal pulses.   Edema:     Peripheral edema absent.   Abdominal:      General: Bowel sounds are normal.      Palpations: Abdomen is soft.      Tenderness: There is no abdominal tenderness.   Musculoskeletal: Normal range of motion.         General: No tenderness. Skin:     General: Skin is warm and dry.   Neurological:      General: " No focal deficit present.      Mental Status: Alert and oriented to person, place and time.          Diagnostic Data  Procedures    Assessment and Plan  Diagnoses and all orders for this visit:    Ventricular tachycardia (paroxysmal) (HCC)- She is s/p ablation, asymptomatic     Hyperlipidemia LDL goal <100- on Lipitor 10 mg, needs a Lipid profile, will do it with PCP next month    Essential hypertension- BP is better even low 100.60, will d.c Amlodipine, keep Valsartan 320 mg and also Metoprolol 50 mg bid    Chronic chest pain with low to moderate risk for CAD- stress nuclear normal , no longer has chest pain    Other orders  -     Tofacitinib Citrate ER (Xeljanz XR) 11 MG tablet sustained-release 24 hour;   mg Tab, Oral, Daily, 0 Refill(s)         Return in about 6 months (around 1/18/2023) for Recheck.    Héctor Dumont MD  07/18/2022

## 2022-08-01 ENCOUNTER — OFFICE VISIT (OUTPATIENT)
Dept: FAMILY MEDICINE CLINIC | Facility: CLINIC | Age: 67
End: 2022-08-01

## 2022-08-01 VITALS
OXYGEN SATURATION: 99 % | WEIGHT: 203 LBS | HEIGHT: 63 IN | HEART RATE: 59 BPM | BODY MASS INDEX: 35.97 KG/M2 | SYSTOLIC BLOOD PRESSURE: 130 MMHG | DIASTOLIC BLOOD PRESSURE: 80 MMHG

## 2022-08-01 DIAGNOSIS — I10 ESSENTIAL HYPERTENSION: ICD-10-CM

## 2022-08-01 DIAGNOSIS — Z00.00 ENCOUNTER FOR ANNUAL WELLNESS EXAM IN MEDICARE PATIENT: Primary | ICD-10-CM

## 2022-08-01 DIAGNOSIS — I47.29 VENTRICULAR TACHYCARDIA (PAROXYSMAL): ICD-10-CM

## 2022-08-01 DIAGNOSIS — N95.1 MENOPAUSAL SYMPTOMS: ICD-10-CM

## 2022-08-01 DIAGNOSIS — Z11.59 ENCOUNTER FOR HEPATITIS C SCREENING TEST FOR LOW RISK PATIENT: ICD-10-CM

## 2022-08-01 DIAGNOSIS — E78.5 HYPERLIPIDEMIA LDL GOAL <100: ICD-10-CM

## 2022-08-01 DIAGNOSIS — Z00.00 ROUTINE GENERAL MEDICAL EXAMINATION AT A HEALTH CARE FACILITY: ICD-10-CM

## 2022-08-01 DIAGNOSIS — M06.9 RHEUMATOID ARTHRITIS OF BOTH WRISTS, UNSPECIFIED WHETHER RHEUMATOID FACTOR PRESENT: ICD-10-CM

## 2022-08-01 DIAGNOSIS — Z86.79 HISTORY OF CORONARY ARTERY DISEASE: ICD-10-CM

## 2022-08-01 DIAGNOSIS — F41.9 ANXIETY: ICD-10-CM

## 2022-08-01 PROCEDURE — 36415 COLL VENOUS BLD VENIPUNCTURE: CPT | Performed by: FAMILY MEDICINE

## 2022-08-01 PROCEDURE — 90677 PCV20 VACCINE IM: CPT | Performed by: FAMILY MEDICINE

## 2022-08-01 PROCEDURE — 1170F FXNL STATUS ASSESSED: CPT | Performed by: FAMILY MEDICINE

## 2022-08-01 PROCEDURE — 96160 PT-FOCUSED HLTH RISK ASSMT: CPT | Performed by: FAMILY MEDICINE

## 2022-08-01 PROCEDURE — 1160F RVW MEDS BY RX/DR IN RCRD: CPT | Performed by: FAMILY MEDICINE

## 2022-08-01 PROCEDURE — G0438 PPPS, INITIAL VISIT: HCPCS | Performed by: FAMILY MEDICINE

## 2022-08-01 PROCEDURE — G0009 ADMIN PNEUMOCOCCAL VACCINE: HCPCS | Performed by: FAMILY MEDICINE

## 2022-08-01 PROCEDURE — 99397 PER PM REEVAL EST PAT 65+ YR: CPT | Performed by: FAMILY MEDICINE

## 2022-08-01 RX ORDER — CLONAZEPAM 1 MG/1
1 TABLET ORAL 2 TIMES DAILY PRN
Qty: 60 TABLET | Refills: 2 | Status: SHIPPED | OUTPATIENT
Start: 2022-08-01 | End: 2022-11-07 | Stop reason: SDUPTHER

## 2022-08-01 NOTE — PROGRESS NOTES
The ABCs of the Annual Wellness Visit  Initial Medicare Wellness Visit    Chief Complaint   Patient presents with   • Med Refill     Subjective   History of Present Illness:  Katie Nicolas is a 66 y.o. female who presents for an Initial Medicare Wellness Visit as well as physical exam and medication refills.  Overall patient doing well.  Major complaints today.  Does take medication for anxiety for which she needs refills.  Bryn report appropriate.  Anxiety controlled.    The following portions of the patient's history were reviewed and   updated as appropriate: allergies, current medications, past family history, past medical history, past social history, past surgical history and problem list.     Compared to one year ago, the patient feels her physical   health is the same.    Compared to one year ago, the patient feels her mental   health is better.    Recent Hospitalizations:  She was not admitted to the hospital during the last year.       Current Medical Providers:  Patient Care Team:  Kalen Leslie MD as PCP - General (Family Medicine)  Héctor Dumont MD as Consulting Physician (Cardiology)    Outpatient Medications Prior to Visit   Medication Sig Dispense Refill   • atorvastatin (LIPITOR) 10 MG tablet TAKE ONE TABLET BY MOUTH DAILY 90 tablet 0   • B Complex Vitamins (Vitamin B Complex) tablet Take  by mouth Daily. Take one tablet by oral route daily     • Biotin 10 MG capsule Take  by mouth. Take one tablet by oral route daily     • Melatonin 200 MCG tablet Take  by mouth.     • metoprolol tartrate (LOPRESSOR) 50 MG tablet TAKE ONE TABLET BY MOUTH TWICE A  tablet 1   • MILK THISTLE PO Take  by mouth.     • naproxen (NAPROSYN) 375 MG tablet TAKE ONE TABLET BY MOUTH TWICE A DAY WITH FOOD (Patient taking differently: Daily.) 180 tablet 0   • omeprazole (priLOSEC) 40 MG capsule Take 1 capsule by mouth Daily. DAILY BEFORE A SINGLE MEAL 90 capsule 0   • Tofacitinib Citrate ER (Xeljanz XR)  11 MG tablet sustained-release 24 hour   mg Tab, Oral, Daily, 0 Refill(s)     • valsartan (DIOVAN) 320 MG tablet Take 1 tablet by mouth Daily. 90 tablet 2   • clonazePAM (KlonoPIN) 1 MG tablet Take 1 tablet by mouth 2 (Two) Times a Day As Needed for Anxiety. Take two tablets by oral route daily at bedtime 60 tablet 2   • ascorbic acid (VITAMIN C) 500 MG tablet Take 500 mg by mouth Daily. Take one tablet by oral route daily     • calcium carbonate (OS-RYANNE) 1250 (500 Ca) MG tablet Take 1 tablet by mouth Daily. Take one tablet by oral route daily;     • Cholecalciferol 25 MCG (1000 UT) tablet Take 1,000 Units by mouth Daily. Take one tablet by oral route daily       No facility-administered medications prior to visit.       No opioid medication identified on active medication list. I have reviewed chart for other potential  high risk medication/s and harmful drug interactions in the elderly.          Aspirin is not on active medication list.  Aspirin use is not indicated based on review of current medical condition/s. Risk of harm outweighs potential benefits.  .    Patient Active Problem List   Diagnosis   • Ventricular tachycardia (paroxysmal) (HCC)   • Hyperlipidemia LDL goal <100   • Hypertension   • Chronic chest pain with low to moderate risk for CAD   • Anxiety   • Rheumatoid arthritis of wrist (HCC)   • History of coronary artery disease     Advance Care Planning  Advance Directive is not on file.  ACP discussion was held with the patient during this visit. Patient has an advance directive (not in EMR), copy requested.    Review of Systems   Constitutional: Negative for fatigue.   HENT: Negative for congestion.    Respiratory: Negative for apnea, cough and shortness of breath.    Cardiovascular: Negative for chest pain.   Gastrointestinal: Negative for abdominal pain.   Musculoskeletal: Negative for arthralgias.   Neurological: Negative for dizziness.   Psychiatric/Behavioral: The patient is not  "nervous/anxious.         Objective       Vitals:    08/01/22 1355   BP: 130/80   Pulse: 59   SpO2: 99%   Weight: 92.1 kg (203 lb)   Height: 158.8 cm (62.5\")     Estimated body mass index is 36.54 kg/m² as calculated from the following:    Height as of this encounter: 158.8 cm (62.5\").    Weight as of this encounter: 92.1 kg (203 lb).    Class 2 Severe Obesity (BMI >=35 and <=39.9). Obesity-related health conditions include the following: hypertension. Obesity is improving with lifestyle modifications. BMI is is above average; BMI management plan is completed. We discussed portion control and increasing exercise.      Does the patient have evidence of cognitive impairment? No    Physical Exam  Vitals and nursing note reviewed.   Constitutional:       General: She is not in acute distress.     Appearance: Normal appearance. She is not ill-appearing.   HENT:      Head: Normocephalic and atraumatic.      Right Ear: Tympanic membrane and ear canal normal.      Left Ear: Tympanic membrane and ear canal normal.      Nose: Nose normal.   Cardiovascular:      Rate and Rhythm: Normal rate and regular rhythm.      Heart sounds: Normal heart sounds.   Pulmonary:      Effort: Pulmonary effort is normal.      Breath sounds: Normal breath sounds.   Neurological:      Mental Status: She is alert and oriented to person, place, and time. Mental status is at baseline.   Psychiatric:         Mood and Affect: Mood normal.               HEALTH RISK ASSESSMENT    Smoking Status:  Social History     Tobacco Use   Smoking Status Never Smoker   Smokeless Tobacco Never Used   Tobacco Comment    UNKNOWN     Alcohol Consumption:  Social History     Substance and Sexual Activity   Alcohol Use Yes    Comment: monthly     Fall Risk Screen:    STEADI Fall Risk Assessment was completed, and patient is at LOW risk for falls.Assessment completed on:8/1/2022    Depression Screen:   PHQ-2/PHQ-9 Depression Screening 8/1/2022   Little Interest or Pleasure " in Doing Things 0-->not at all   Feeling Down, Depressed or Hopeless 0-->not at all   PHQ-9: Brief Depression Severity Measure Score 0       Health Habits and Functional and Cognitive Screening:  Functional & Cognitive Status 8/1/2022   Do you have difficulty preparing food and eating? No   Do you have difficulty bathing yourself, getting dressed or grooming yourself? No   Do you have difficulty using the toilet? No   Do you have difficulty moving around from place to place? No   Do you have trouble with steps or getting out of a bed or a chair? No   Current Diet Well Balanced Diet   Dental Exam Not up to date   Eye Exam Up to date   Exercise (times per week) 6 times per week   Current Exercises Include Aerobics   Do you need help using the phone?  No   Are you deaf or do you have serious difficulty hearing?  No   Do you need help with transportation? No   Do you need help shopping? No   Do you need help preparing meals?  No   Do you need help with housework?  No   Do you need help with laundry? No   Do you need help taking your medications? No   Do you need help managing money? No   Do you ever drive or ride in a car without wearing a seat belt? No   Have you felt unusual stress, anger or loneliness in the last month? Yes   Who do you live with? Alone   If you need help, do you have trouble finding someone available to you? No   Have you been bothered in the last four weeks by sexual problems? No       Age-appropriate Screening Schedule:  Refer to the list below for future screening recommendations based on patient's age, sex and/or medical conditions. Orders for these recommended tests are listed in the plan section. The patient has been provided with a written plan.    Health Maintenance   Topic Date Due   • DXA SCAN  Never done   • LIPID PANEL  Never done   • TDAP/TD VACCINES (1 - Tdap) 08/01/2023 (Originally 8/11/1974)   • ZOSTER VACCINE (1 of 2) 08/01/2023 (Originally 8/11/2006)   • INFLUENZA VACCINE   10/01/2022   • MAMMOGRAM  11/09/2023            Assessment & Plan   CMS Preventative Services Quick Reference  Risk Factors Identified During Encounter  Chronic Pain   The above risks/problems have been discussed with the patient.  Follow up actions/plans if indicated are seen below in the Assessment/Plan Section.  Pertinent information has been shared with the patient in the After Visit Summary.    Diagnoses and all orders for this visit:    1. Encounter for annual wellness exam in Medicare patient (Primary)    2. Routine general medical examination at a health care facility    3. Essential hypertension  -     CBC Auto Differential; Future  -     Comprehensive Metabolic Panel; Future  -     Lipid Panel; Future  -     TSH; Future  -     CBC Auto Differential  -     Comprehensive Metabolic Panel  -     Lipid Panel  -     TSH    4. Hyperlipidemia LDL goal <100  -     CBC Auto Differential; Future  -     Comprehensive Metabolic Panel; Future  -     Lipid Panel; Future  -     TSH; Future  -     CBC Auto Differential  -     Comprehensive Metabolic Panel  -     Lipid Panel  -     TSH    5. Anxiety  -     clonazePAM (KlonoPIN) 1 MG tablet; Take 1 tablet by mouth 2 (Two) Times a Day As Needed for Anxiety. Take two tablets by oral route daily at bedtime  Dispense: 60 tablet; Refill: 2    6. Ventricular tachycardia (paroxysmal) (HCC)    7. Encounter for hepatitis C screening test for low risk patient  -     Hepatitis C Antibody; Future  -     Hepatitis C Antibody    8. Menopausal symptoms  -     DEXA Bone Density Axial; Future    9. History of coronary artery disease    10. Rheumatoid arthritis of both wrists, unspecified whether rheumatoid factor present (HCC)    Other orders  -     Pneumococcal Conjugate Vaccine 20-Valent (PCV20)      Appropriate health maintenance discussed.  Asked appropriate vaccines and cancer screenings.  Appropriate labs obtained today.  Medication refills given.      Follow Up:  No follow-ups on  file.     An After Visit Summary and PPPS were made available to the patient.

## 2022-08-02 LAB
ALBUMIN SERPL-MCNC: 4.5 G/DL (ref 3.8–4.8)
ALBUMIN/GLOB SERPL: 1.8 {RATIO} (ref 1.2–2.2)
ALP SERPL-CCNC: 81 IU/L (ref 44–121)
ALT SERPL-CCNC: 23 IU/L (ref 0–32)
AST SERPL-CCNC: 23 IU/L (ref 0–40)
BASOPHILS # BLD AUTO: 0 X10E3/UL (ref 0–0.2)
BASOPHILS NFR BLD AUTO: 1 %
BILIRUB SERPL-MCNC: 0.5 MG/DL (ref 0–1.2)
BUN SERPL-MCNC: 11 MG/DL (ref 8–27)
BUN/CREAT SERPL: 14 (ref 12–28)
CALCIUM SERPL-MCNC: 9.4 MG/DL (ref 8.7–10.3)
CHLORIDE SERPL-SCNC: 102 MMOL/L (ref 96–106)
CHOLEST SERPL-MCNC: 153 MG/DL (ref 100–199)
CO2 SERPL-SCNC: 25 MMOL/L (ref 20–29)
CREAT SERPL-MCNC: 0.81 MG/DL (ref 0.57–1)
EGFRCR SERPLBLD CKD-EPI 2021: 81 ML/MIN/1.73
EOSINOPHIL # BLD AUTO: 0.2 X10E3/UL (ref 0–0.4)
EOSINOPHIL NFR BLD AUTO: 2 %
ERYTHROCYTE [DISTWIDTH] IN BLOOD BY AUTOMATED COUNT: 14.9 % (ref 11.7–15.4)
GLOBULIN SER CALC-MCNC: 2.5 G/DL (ref 1.5–4.5)
GLUCOSE SERPL-MCNC: 109 MG/DL (ref 65–99)
HCT VFR BLD AUTO: 43 % (ref 34–46.6)
HCV AB S/CO SERPL IA: 0.1 S/CO RATIO (ref 0–0.9)
HDLC SERPL-MCNC: 43 MG/DL
HGB BLD-MCNC: 14.5 G/DL (ref 11.1–15.9)
IMM GRANULOCYTES # BLD AUTO: 0 X10E3/UL (ref 0–0.1)
IMM GRANULOCYTES NFR BLD AUTO: 1 %
LDLC SERPL CALC-MCNC: 84 MG/DL (ref 0–99)
LYMPHOCYTES # BLD AUTO: 1.5 X10E3/UL (ref 0.7–3.1)
LYMPHOCYTES NFR BLD AUTO: 21 %
MCH RBC QN AUTO: 30.7 PG (ref 26.6–33)
MCHC RBC AUTO-ENTMCNC: 33.7 G/DL (ref 31.5–35.7)
MCV RBC AUTO: 91 FL (ref 79–97)
MONOCYTES # BLD AUTO: 0.8 X10E3/UL (ref 0.1–0.9)
MONOCYTES NFR BLD AUTO: 11 %
NEUTROPHILS # BLD AUTO: 4.6 X10E3/UL (ref 1.4–7)
NEUTROPHILS NFR BLD AUTO: 64 %
PLATELET # BLD AUTO: 361 X10E3/UL (ref 150–450)
POTASSIUM SERPL-SCNC: 5 MMOL/L (ref 3.5–5.2)
PROT SERPL-MCNC: 7 G/DL (ref 6–8.5)
RBC # BLD AUTO: 4.73 X10E6/UL (ref 3.77–5.28)
SODIUM SERPL-SCNC: 139 MMOL/L (ref 134–144)
TRIGL SERPL-MCNC: 148 MG/DL (ref 0–149)
TSH SERPL DL<=0.005 MIU/L-ACNC: 2.6 UIU/ML (ref 0.45–4.5)
VLDLC SERPL CALC-MCNC: 26 MG/DL (ref 5–40)
WBC # BLD AUTO: 7.1 X10E3/UL (ref 3.4–10.8)

## 2022-08-09 RX ORDER — OMEPRAZOLE 40 MG/1
CAPSULE, DELAYED RELEASE ORAL
Qty: 90 CAPSULE | Refills: 0 | Status: SHIPPED | OUTPATIENT
Start: 2022-08-09 | End: 2022-11-03

## 2022-08-19 DIAGNOSIS — I47.29 VENTRICULAR TACHYCARDIA (PAROXYSMAL): ICD-10-CM

## 2022-08-19 RX ORDER — METOPROLOL TARTRATE 50 MG/1
TABLET, FILM COATED ORAL
Qty: 180 TABLET | Refills: 1 | Status: SHIPPED | OUTPATIENT
Start: 2022-08-19 | End: 2023-01-18 | Stop reason: SDUPTHER

## 2022-09-21 ENCOUNTER — TELEPHONE (OUTPATIENT)
Dept: FAMILY MEDICINE CLINIC | Facility: CLINIC | Age: 67
End: 2022-09-21

## 2022-09-21 NOTE — TELEPHONE ENCOUNTER
PT CALLED AND WAS COMPLAINING ABOUT HER BACK WAS HURTING AROUND HER RIB CAGE. PT SAID IT WAS HURTING FROM THE INSIDE OUT PT SAID THAT WHEN SHE SNEEZES IT PUTS HER IN BAD WAY.    TALKED TO DR ALLAN AND HE TOLD ME TO TELL HER TO TAKE SOME IBUPROFEN 3 TIMES A DAY FOR 3 DAYS AND SEE IF THAT WORKS. IF IT DOESN'T MAKE AN APPT TO BE SEEN..

## 2022-09-28 ENCOUNTER — OFFICE VISIT (OUTPATIENT)
Dept: FAMILY MEDICINE CLINIC | Facility: CLINIC | Age: 67
End: 2022-09-28

## 2022-09-28 VITALS
BODY MASS INDEX: 36.2 KG/M2 | OXYGEN SATURATION: 98 % | WEIGHT: 196.7 LBS | DIASTOLIC BLOOD PRESSURE: 82 MMHG | HEART RATE: 61 BPM | SYSTOLIC BLOOD PRESSURE: 120 MMHG | HEIGHT: 62 IN

## 2022-09-28 DIAGNOSIS — B02.9 HERPES ZOSTER WITHOUT COMPLICATION: Primary | ICD-10-CM

## 2022-09-28 PROCEDURE — 99213 OFFICE O/P EST LOW 20 MIN: CPT | Performed by: NURSE PRACTITIONER

## 2022-09-28 RX ORDER — UBIDECARENONE 100 MG
200 CAPSULE ORAL DAILY
COMMUNITY

## 2022-09-28 RX ORDER — GABAPENTIN 100 MG/1
100 CAPSULE ORAL 3 TIMES DAILY PRN
Qty: 30 CAPSULE | Refills: 0 | Status: SHIPPED | OUTPATIENT
Start: 2022-09-28 | End: 2022-11-07

## 2022-09-28 RX ORDER — ACETAMINOPHEN,DIPHENHYDRAMINE HCL 500; 25 MG/1; MG/1
1 TABLET, FILM COATED ORAL NIGHTLY PRN
COMMUNITY

## 2022-09-28 RX ORDER — VALACYCLOVIR HYDROCHLORIDE 1 G/1
1000 TABLET, FILM COATED ORAL 3 TIMES DAILY
Qty: 21 TABLET | Refills: 0 | Status: SHIPPED | OUTPATIENT
Start: 2022-09-28 | End: 2022-11-07

## 2022-09-28 NOTE — PROGRESS NOTES
"Chief Complaint  Rash    Subjective          Katie Nicolas presents to [facility]  History of Present Illness     Patient states about a week ago she started having right mid sided back pain.  She thought it was arthritis but then states yesterday a red blistery rash..  Looks like shingles.  States it is a burning deep pain.  No discharge.  Blisters just started popping up today.  States she has had shingles twice in the past.  She has not had a shingles shot.    Objective   Vital Signs:   /82   Pulse 61   Ht 157.5 cm (62\")   Wt 89.2 kg (196 lb 11.2 oz)   SpO2 98%   BMI 35.98 kg/m²     Body mass index is 35.98 kg/m².    Review of Systems   Constitutional: Negative for chills, fatigue and fever.   Respiratory: Negative for cough and shortness of breath.    Cardiovascular: Negative for chest pain.   Skin: Positive for rash.   Neurological: Negative for dizziness and headache.       Past History:  Medical History: has a past medical history of Anxiety, Benign essential hypertension, Chest pain, unspecified, Depressive disorder, Diverticulosis (), GERD without esophagitis, Hyperlipidemia, unspecified, Kidney stones (), Paroxysmal supraventricular tachycardia (HCC), Rheumatoid arthritis (HCC), Sleep apnea (), and Ventricular tachycardia (HCC).   Surgical History: has a past surgical history that includes  section.   Family History: family history includes Heart disease in her father; Obesity in an other family member.   Social History: reports that she has never smoked. She has never used smokeless tobacco. She reports current alcohol use. She reports that she does not use drugs.    PHQ-2 Depression Screening  Little interest or pleasure in doing things? 0-->not at all   Feeling down, depressed, or hopeless? 0-->not at all   PHQ-2 Total Score 0        PHQ-9 Depression Screening  Little interest or pleasure in doing things? 0-->not at all   Feeling down, " depressed, or hopeless? 0-->not at all   Trouble falling or staying asleep, or sleeping too much?     Feeling tired or having little energy?     Poor appetite or overeating?     Feeling bad about yourself - or that you are a failure or have let yourself or your family down?     Trouble concentrating on things, such as reading the newspaper or watching television?     Moving or speaking so slowly that other people could have noticed? Or the opposite - being so fidgety or restless that you have been moving around a lot more than usual?     Thoughts that you would be better off dead, or of hurting yourself in some way?     PHQ-9 Total Score 0   If you checked off any problems, how difficult have these problems made it for you to do your work, take care of things at home, or get along with other people?       PHQ-9 Total Score: 0      Patient screened positive for depression based on a PHQ-9 score of 0 on 9/28/2022. Follow-up recommendations include:       Current Outpatient Medications:   •  atorvastatin (LIPITOR) 10 MG tablet, TAKE ONE TABLET BY MOUTH DAILY, Disp: 90 tablet, Rfl: 0  •  B Complex Vitamins (Vitamin B Complex) tablet, Take  by mouth Daily. Take one tablet by oral route daily, Disp: , Rfl:   •  Biotin 10 MG capsule, Take  by mouth. Take one tablet by oral route daily, Disp: , Rfl:   •  clonazePAM (KlonoPIN) 1 MG tablet, Take 1 tablet by mouth 2 (Two) Times a Day As Needed for Anxiety. Take two tablets by oral route daily at bedtime, Disp: 60 tablet, Rfl: 2  •  coenzyme Q10 100 MG capsule, Take 200 mg by mouth Daily., Disp: , Rfl:   •  diphenhydrAMINE-acetaminophen (TYLENOL PM)  MG tablet per tablet, Take 1 tablet by mouth At Night As Needed for Sleep., Disp: , Rfl:   •  Melatonin 200 MCG tablet, Take  by mouth., Disp: , Rfl:   •  metoprolol tartrate (LOPRESSOR) 50 MG tablet, TAKE ONE TABLET BY MOUTH TWICE A DAY, Disp: 180 tablet, Rfl: 1  •  MILK THISTLE PO, Take  by mouth., Disp: , Rfl:   •   naproxen (NAPROSYN) 375 MG tablet, TAKE ONE TABLET BY MOUTH TWICE A DAY WITH FOOD (Patient taking differently: Daily.), Disp: 180 tablet, Rfl: 0  •  omeprazole (priLOSEC) 40 MG capsule, TAKE ONE CAPSULE BY MOUTH DAILY BEFORE A MEAL, Disp: 90 capsule, Rfl: 0  •  Tofacitinib Citrate ER (Xeljanz XR) 11 MG tablet sustained-release 24 hour,  mg Tab, Oral, Daily, 0 Refill(s), Disp: , Rfl:   •  valsartan (DIOVAN) 320 MG tablet, Take 1 tablet by mouth Daily., Disp: 90 tablet, Rfl: 2  •  valACYclovir (Valtrex) 1000 MG tablet, Take 1 tablet by mouth 3 (Three) Times a Day., Disp: 21 tablet, Rfl: 0   (Not in a hospital admission)     Allergies: Niacin    Physical Exam  Constitutional:       Appearance: Normal appearance.   Cardiovascular:      Rate and Rhythm: Normal rate and regular rhythm.      Heart sounds: Normal heart sounds.   Pulmonary:      Effort: Pulmonary effort is normal.      Breath sounds: Normal breath sounds.   Skin:         Neurological:      General: No focal deficit present.      Mental Status: She is alert and oriented to person, place, and time. Mental status is at baseline.   Psychiatric:         Mood and Affect: Mood normal.         Behavior: Behavior normal.         Thought Content: Thought content normal.         Judgment: Judgment normal.          Result Review :                   Assessment and Plan    Diagnoses and all orders for this visit:    1. Herpes zoster without complication (Primary)  Assessment & Plan:  We will start Valtrex.  We will asked Dr. Leslie to feel gabapentin to take as needed for pain.  Monitor for sedation.  She knows not to take the gabapentin within 4-6 hours of the clonazepam. Risks discussed and understood.  UDS completed.  Bryn appropriate.  Risk of meds discussed understood.  Return in 1 week if no improvement, sooner if worsens.  Keep follow-up with Dr. Leslie as scheduled.  Education provided on how to prevent spread and who to avoid.  Return to clinic or ED with  any issues or concerns.    Orders:  -     valACYclovir (Valtrex) 1000 MG tablet; Take 1 tablet by mouth 3 (Three) Times a Day.  Dispense: 21 tablet; Refill: 0                   Follow Up   Return if symptoms worsen or fail to improve, for Has f/up with Dr. Laird 11/7/22.  Patient was given instructions and counseling regarding her condition or for health maintenance advice. Please see specific information pulled into the AVS if appropriate.     AUGUSTINE Cole

## 2022-09-28 NOTE — TELEPHONE ENCOUNTER
Saw your patient today, has shingles on her back. Do you care to fill the gabapentin for her to take PRN. She knows not to take within 4-6 hours of clonazapm. Thanks

## 2022-09-28 NOTE — ASSESSMENT & PLAN NOTE
We will start Valtrex.  We will asked Dr. Leslie to feel gabapentin to take as needed for pain.  Monitor for sedation.  She knows not to take the gabapentin within 4-6 hours of the clonazepam. Risks discussed and understood.  UDS completed.  Bryn appropriate.  Risk of meds discussed understood.  Return in 1 week if no improvement, sooner if worsens.  Keep follow-up with Dr. Leslie as scheduled.  Education provided on how to prevent spread and who to avoid.  Return to clinic or ED with any issues or concerns.

## 2022-09-30 RX ORDER — ATORVASTATIN CALCIUM 10 MG/1
TABLET, FILM COATED ORAL
Qty: 90 TABLET | Refills: 0 | Status: SHIPPED | OUTPATIENT
Start: 2022-09-30 | End: 2022-11-07 | Stop reason: SDUPTHER

## 2022-10-01 DIAGNOSIS — B02.9 HERPES ZOSTER WITHOUT COMPLICATION: ICD-10-CM

## 2022-10-03 RX ORDER — VALACYCLOVIR HYDROCHLORIDE 1 G/1
TABLET, FILM COATED ORAL
Qty: 21 TABLET | Refills: 0 | OUTPATIENT
Start: 2022-10-03

## 2022-10-05 DIAGNOSIS — B02.9 HERPES ZOSTER WITHOUT COMPLICATION: ICD-10-CM

## 2022-10-05 RX ORDER — GABAPENTIN 100 MG/1
CAPSULE ORAL
Qty: 30 CAPSULE | OUTPATIENT
Start: 2022-10-05

## 2022-10-05 NOTE — TELEPHONE ENCOUNTER
Called patient, she is getting better, but rash is still present and hasn't scabbed over. She took the last valtrex this morning and is finished. She didn't need gabapentin refilled, she still has some left.

## 2022-10-10 ENCOUNTER — CLINICAL SUPPORT (OUTPATIENT)
Dept: CARDIOLOGY | Facility: CLINIC | Age: 67
End: 2022-10-10
Payer: MEDICARE

## 2022-11-03 RX ORDER — OMEPRAZOLE 40 MG/1
CAPSULE, DELAYED RELEASE ORAL
Qty: 90 CAPSULE | Refills: 0 | Status: SHIPPED | OUTPATIENT
Start: 2022-11-03 | End: 2023-01-30

## 2022-11-07 ENCOUNTER — OFFICE VISIT (OUTPATIENT)
Dept: FAMILY MEDICINE CLINIC | Facility: CLINIC | Age: 67
End: 2022-11-07

## 2022-11-07 VITALS
OXYGEN SATURATION: 98 % | HEART RATE: 62 BPM | WEIGHT: 197 LBS | HEIGHT: 62 IN | BODY MASS INDEX: 36.25 KG/M2 | DIASTOLIC BLOOD PRESSURE: 98 MMHG | SYSTOLIC BLOOD PRESSURE: 140 MMHG

## 2022-11-07 DIAGNOSIS — I10 PRIMARY HYPERTENSION: ICD-10-CM

## 2022-11-07 DIAGNOSIS — Z11.1 SCREENING FOR TUBERCULOSIS: ICD-10-CM

## 2022-11-07 DIAGNOSIS — E78.5 HYPERLIPIDEMIA LDL GOAL <100: ICD-10-CM

## 2022-11-07 DIAGNOSIS — F41.9 ANXIETY: Primary | ICD-10-CM

## 2022-11-07 PROCEDURE — 90662 IIV NO PRSV INCREASED AG IM: CPT | Performed by: FAMILY MEDICINE

## 2022-11-07 PROCEDURE — 99214 OFFICE O/P EST MOD 30 MIN: CPT | Performed by: FAMILY MEDICINE

## 2022-11-07 PROCEDURE — G0008 ADMIN INFLUENZA VIRUS VAC: HCPCS | Performed by: FAMILY MEDICINE

## 2022-11-07 RX ORDER — ATORVASTATIN CALCIUM 10 MG/1
10 TABLET, FILM COATED ORAL DAILY
Qty: 90 TABLET | Refills: 1 | Status: SHIPPED | OUTPATIENT
Start: 2022-11-07 | End: 2023-01-18 | Stop reason: SDUPTHER

## 2022-11-07 RX ORDER — CLONAZEPAM 1 MG/1
1 TABLET ORAL 2 TIMES DAILY PRN
Qty: 60 TABLET | Refills: 2 | Status: SHIPPED | OUTPATIENT
Start: 2022-11-07 | End: 2023-02-09 | Stop reason: SDUPTHER

## 2022-11-08 NOTE — PROGRESS NOTES
Follow Up Office Visit      Date of Visit:  2022   Patient Name: Katie Nicolas  : 1955   MRN: [id number]     Chief Complaint:    Chief Complaint   Patient presents with   • Med Refill       History of Present Illness: Katie Nicolas is a 67 y.o. female who is here today for follow up.  Patient comes in today for recheck on her chronic medical conditions.  Bryn report appropriate.  Taking medications as directed.  Hypertension under good control.  Anxiety controlled on current medication.  Needing a recheck of her lipids at our next check.        Subjective      Review of Systems:   Review of Systems   Constitutional: Negative for fatigue and fever.   HENT: Negative for congestion and ear pain.    Respiratory: Negative for apnea, cough, chest tightness and shortness of breath.    Cardiovascular: Negative for chest pain.   Gastrointestinal: Negative for abdominal pain, constipation, diarrhea and nausea.   Musculoskeletal: Negative for arthralgias.   Psychiatric/Behavioral: Negative for depressed mood and stress.       Past Medical History:   Past Medical History:   Diagnosis Date   • Anxiety    • Benign essential hypertension    • Chest pain, unspecified    • Depressive disorder    • Diverticulosis     noted on CT scan for Kidney stones   • GERD without esophagitis    • Hyperlipidemia, unspecified    • Kidney stones     other episodes too   • Paroxysmal supraventricular tachycardia (HCC)     bypass tract ablation   • Rheumatoid arthritis (HCC)     meds   • Sleep apnea 2006    cpap   • Ventricular tachycardia     The patient visits the office for an evaluationof ventricular tachcardia. Additional  information: No complaints, s/p ablation.       Past Surgical History:   Past Surgical History:   Procedure Laterality Date   •  SECTION         Family History:   Family History   Problem Relation Age of Onset   • Heart disease Father    • Obesity Other        Social History:   Social  History     Socioeconomic History   • Marital status: Single   Tobacco Use   • Smoking status: Never   • Smokeless tobacco: Never   • Tobacco comments:     UNKNOWN   Vaping Use   • Vaping Use: Never used   Substance and Sexual Activity   • Alcohol use: Yes     Comment: monthly   • Drug use: Never   • Sexual activity: Defer       Medications:     Current Outpatient Medications:   •  atorvastatin (LIPITOR) 10 MG tablet, Take 1 tablet by mouth Daily., Disp: 90 tablet, Rfl: 1  •  B Complex Vitamins (Vitamin B Complex) tablet, Take  by mouth Daily. Take one tablet by oral route daily, Disp: , Rfl:   •  Biotin 10 MG capsule, Take  by mouth. Take one tablet by oral route daily, Disp: , Rfl:   •  clonazePAM (KlonoPIN) 1 MG tablet, Take 1 tablet by mouth 2 (Two) Times a Day As Needed for Anxiety. Take two tablets by oral route daily at bedtime, Disp: 60 tablet, Rfl: 2  •  coenzyme Q10 100 MG capsule, Take 200 mg by mouth Daily., Disp: , Rfl:   •  diphenhydrAMINE-acetaminophen (TYLENOL PM)  MG tablet per tablet, Take 1 tablet by mouth At Night As Needed for Sleep., Disp: , Rfl:   •  Melatonin 200 MCG tablet, Take  by mouth., Disp: , Rfl:   •  metoprolol tartrate (LOPRESSOR) 50 MG tablet, TAKE ONE TABLET BY MOUTH TWICE A DAY, Disp: 180 tablet, Rfl: 1  •  MILK THISTLE PO, Take  by mouth., Disp: , Rfl:   •  naproxen (NAPROSYN) 375 MG tablet, TAKE ONE TABLET BY MOUTH TWICE A DAY WITH FOOD (Patient taking differently: Daily.), Disp: 180 tablet, Rfl: 0  •  omeprazole (priLOSEC) 40 MG capsule, TAKE ONE CAPSULE BY MOUTH DAILY BEFORE A MEAL, Disp: 90 capsule, Rfl: 0  •  Tofacitinib Citrate ER (Xeljanz XR) 11 MG tablet sustained-release 24 hour,  mg Tab, Oral, Daily, 0 Refill(s), Disp: , Rfl:   •  valsartan (DIOVAN) 320 MG tablet, Take 1 tablet by mouth Daily., Disp: 90 tablet, Rfl: 2    Allergies:   Allergies   Allergen Reactions   • Niacin Nausea And Vomiting       Objective     Physical Exam:  Vital Signs:   Vitals:     "11/07/22 1048   BP: 140/98   Pulse: 62   SpO2: 98%   Weight: 89.4 kg (197 lb)   Height: 157.5 cm (62\")     Body mass index is 36.03 kg/m².     Physical Exam  Vitals and nursing note reviewed.   Constitutional:       General: She is not in acute distress.     Appearance: Normal appearance. She is not ill-appearing.   HENT:      Head: Normocephalic and atraumatic.      Right Ear: Tympanic membrane and ear canal normal.      Left Ear: Tympanic membrane and ear canal normal.      Nose: Nose normal.   Cardiovascular:      Rate and Rhythm: Normal rate and regular rhythm.      Heart sounds: Normal heart sounds.   Pulmonary:      Effort: Pulmonary effort is normal.      Breath sounds: Normal breath sounds.   Neurological:      Mental Status: She is alert and oriented to person, place, and time. Mental status is at baseline.   Psychiatric:         Mood and Affect: Mood normal.         Procedures      Assessment / Plan      Assessment/Plan:   Diagnoses and all orders for this visit:    1. Anxiety (Primary)  -     clonazePAM (KlonoPIN) 1 MG tablet; Take 1 tablet by mouth 2 (Two) Times a Day As Needed for Anxiety. Take two tablets by oral route daily at bedtime  Dispense: 60 tablet; Refill: 2    2. Screening for tuberculosis  -     QuantiFERON TB Gold; Future    3. Hyperlipidemia LDL goal <100    4. Primary hypertension    Other orders  -     atorvastatin (LIPITOR) 10 MG tablet; Take 1 tablet by mouth Daily.  Dispense: 90 tablet; Refill: 1  -     Fluzone High-Dose 65+yrs (4099-1320)         Renewed prescription medication.  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  Bryn report appropriate.  Medical condition stable.    Follow Up:   No follow-ups on file.    Kalen Leslie  [facility]   "

## 2022-12-06 ENCOUNTER — TELEPHONE (OUTPATIENT)
Dept: FAMILY MEDICINE CLINIC | Facility: CLINIC | Age: 67
End: 2022-12-06

## 2022-12-06 NOTE — TELEPHONE ENCOUNTER
PHONE CALL FROM [facility] OFFICE.  THEY WOULD LIKE A COPY OF PATIENTS TB TEST.  PLEASE FAX [phone]    CALL IF NEEDED [phone] [facility]

## 2022-12-07 NOTE — TELEPHONE ENCOUNTER
Gave them a call back, Leslie put in an order for the blood test to check for TB, we do not offer that at this office, so I'm not sure where the patient went to have this done, we do not have any results on it.

## 2023-01-03 RX ORDER — NAPROXEN 375 MG/1
TABLET ORAL
Qty: 180 TABLET | Refills: 0 | Status: SHIPPED | OUTPATIENT
Start: 2023-01-03

## 2023-01-18 ENCOUNTER — OFFICE VISIT (OUTPATIENT)
Dept: CARDIOLOGY | Facility: CLINIC | Age: 68
End: 2023-01-18
Payer: MEDICARE

## 2023-01-18 VITALS
RESPIRATION RATE: 18 BRPM | HEIGHT: 62 IN | SYSTOLIC BLOOD PRESSURE: 110 MMHG | TEMPERATURE: 97.1 F | OXYGEN SATURATION: 99 % | DIASTOLIC BLOOD PRESSURE: 74 MMHG | BODY MASS INDEX: 35.88 KG/M2 | HEART RATE: 63 BPM | WEIGHT: 195 LBS

## 2023-01-18 DIAGNOSIS — I10 PRIMARY HYPERTENSION: Primary | ICD-10-CM

## 2023-01-18 DIAGNOSIS — I47.29 VENTRICULAR TACHYCARDIA (PAROXYSMAL): ICD-10-CM

## 2023-01-18 DIAGNOSIS — E78.5 HYPERLIPIDEMIA LDL GOAL <100: ICD-10-CM

## 2023-01-18 PROBLEM — Z86.79 HISTORY OF CORONARY ARTERY DISEASE: Status: RESOLVED | Noted: 2022-08-01 | Resolved: 2023-01-18

## 2023-01-18 PROCEDURE — 99214 OFFICE O/P EST MOD 30 MIN: CPT | Performed by: INTERNAL MEDICINE

## 2023-01-18 RX ORDER — VALSARTAN 160 MG/1
160 TABLET ORAL DAILY
Qty: 90 TABLET | Refills: 3 | Status: SHIPPED | OUTPATIENT
Start: 2023-01-18

## 2023-01-18 RX ORDER — METOPROLOL TARTRATE 50 MG/1
50 TABLET, FILM COATED ORAL 2 TIMES DAILY
Qty: 180 TABLET | Refills: 3 | Status: SHIPPED | OUTPATIENT
Start: 2023-01-18

## 2023-01-18 RX ORDER — ATORVASTATIN CALCIUM 10 MG/1
10 TABLET, FILM COATED ORAL DAILY
Qty: 90 TABLET | Refills: 1 | Status: SHIPPED | OUTPATIENT
Start: 2023-01-18

## 2023-01-18 RX ORDER — OMEGA-3S/DHA/EPA/FISH OIL/D3 300MG-1000
400 CAPSULE ORAL DAILY
COMMUNITY

## 2023-01-18 NOTE — PROGRESS NOTES
[facility]  [facility]IOLOGY  [address] [place] [address]  Dept: [phone]  Dept Fax: [phone]    Katie Nicolas  1955    Follow Up Office Visit Note    History of Present Illness:  Katie Nicolas is a 67 y.o. female who presents to the clinic for Follow-up and Ventricular Tachycardia. She is s/p ablation. No complaints on Metoprolol 50 mg bid,     The following portions of the patient's history were reviewed and updated as appropriate: allergies, current medications, past family history, past medical history, past social history, past surgical history, and problem list.    Medications:  atorvastatin  Biotin capsule  cholecalciferol  clonazePAM  coenzyme Q10  diphenhydrAMINE-acetaminophen tablet  Melatonin tablet  metoprolol tartrate  MILK THISTLE PO  naproxen  omeprazole  valsartan  Vitamin B Complex tablet  Xeljanz XR tablet sustained-release 24 hour    Subjective  Allergies   Allergen Reactions   • Niacin Nausea And Vomiting        Past Medical History:   Diagnosis Date   • Anxiety    • Benign essential hypertension    • Chest pain, unspecified    • Depressive disorder    • Diverticulosis     noted on CT scan for Kidney stones   • GERD without esophagitis    • Hyperlipidemia, unspecified    • Kidney stones     other episodes too   • Paroxysmal supraventricular tachycardia (HCC)     bypass tract ablation   • Rheumatoid arthritis (HCC)     meds   • Sleep apnea 2006    cpap   • Ventricular tachycardia     The patient visits the office for an evaluationof ventricular tachcardia. Additional  information: No complaints, s/p ablation.       Past Surgical History:   Procedure Laterality Date   •  SECTION         Family History   Problem Relation Age of Onset   • Heart disease Father    • Obesity Other         Social History     Socioeconomic History   • Marital status: Single   Tobacco Use   • Smoking status: Never   • Smokeless tobacco: Never   •  "Tobacco comments:     UNKNOWN   Vaping Use   • Vaping Use: Never used   Substance and Sexual Activity   • Alcohol use: Yes     Comment: monthly   • Drug use: Never   • Sexual activity: Defer       Review of Systems   Constitutional: Negative.    HENT: Negative.    Respiratory: Negative.    Cardiovascular: Negative.    Endocrine: Negative.    Genitourinary: Negative.    Musculoskeletal: Negative.    Skin: Negative.    Allergic/Immunologic: Negative.    Neurological: Negative.    Hematological: Negative.    Psychiatric/Behavioral: Negative.        Cardiovascular Procedures    ECHO/MUGA:  STRESS TESTS:   CARDIAC CATH:   DEVICES:   HOLTER:   CT/MRI:   VASCULAR:   CARDIOTHORACIC:     Objective  Vitals:    01/18/23 1256   BP: 110/74   BP Location: Right arm   Patient Position: Lying   Cuff Size: Large Adult   Pulse: 63   Resp: 18   Temp: 97.1 °F (36.2 °C)   TempSrc: Infrared   SpO2: 99%   Weight: 88.5 kg (195 lb)   Height: 157.5 cm (62\")   PainSc: 0-No pain     Body mass index is 35.67 kg/m².     Physical Exam  Constitutional:       Appearance: Healthy appearance. Not in distress.   Neck:      Vascular: No JVR. JVD normal.   Pulmonary:      Effort: Pulmonary effort is normal.      Breath sounds: Normal breath sounds. No wheezing. No rhonchi. No rales.   Chest:      Chest wall: Not tender to palpatation.   Cardiovascular:      PMI at left midclavicular line. Normal rate. Regular rhythm. Normal S1. Normal S2.      Murmurs: There is no murmur.      No gallop. No click. No rub.   Pulses:     Intact distal pulses.   Edema:     Peripheral edema absent.   Abdominal:      General: Bowel sounds are normal.      Palpations: Abdomen is soft.      Tenderness: There is no abdominal tenderness.   Musculoskeletal: Normal range of motion.         General: No tenderness. Skin:     General: Skin is warm and dry.   Neurological:      General: No focal deficit present.      Mental Status: Alert and oriented to person, place and time.      "     Diagnostic Data  Procedures    Assessment and Plan  Diagnoses and all orders for this visit:    Primary hypertension-BP is kind of low 105.60 , vj lower Valsartan to 160 mg, keep Metoprolol 50 mg bid    Hyperlipidemia LDL goal <100- On Lipitor 10 mg, levels are good     Ventricular tachycardia (paroxysmal)- No more complaints s/p ablation  -     metoprolol tartrate (LOPRESSOR) 50 MG tablet; Take 1 tablet by mouth 2 (Two) Times a Day.    Other orders  -     cholecalciferol (VITAMIN D3) 10 MCG (400 UNIT) tablet; Take 400 Units by mouth Daily.  -     valsartan (DIOVAN) 160 MG tablet; Take 1 tablet by mouth Daily.  -     atorvastatin (LIPITOR) 10 MG tablet; Take 1 tablet by mouth Daily.         Return in about 6 months (around 7/18/2023) for Recheck with Dr. Dumont.    Héctor Dumont MD  01/18/2023

## 2023-01-30 RX ORDER — OMEPRAZOLE 40 MG/1
CAPSULE, DELAYED RELEASE ORAL
Qty: 90 CAPSULE | Refills: 0 | Status: SHIPPED | OUTPATIENT
Start: 2023-01-30 | End: 2023-02-09 | Stop reason: SDUPTHER

## 2023-02-09 ENCOUNTER — OFFICE VISIT (OUTPATIENT)
Dept: FAMILY MEDICINE CLINIC | Facility: CLINIC | Age: 68
End: 2023-02-09
Payer: MEDICARE

## 2023-02-09 VITALS
BODY MASS INDEX: 36.62 KG/M2 | OXYGEN SATURATION: 98 % | WEIGHT: 199 LBS | DIASTOLIC BLOOD PRESSURE: 80 MMHG | HEART RATE: 67 BPM | SYSTOLIC BLOOD PRESSURE: 140 MMHG | HEIGHT: 62 IN

## 2023-02-09 DIAGNOSIS — F41.9 ANXIETY: ICD-10-CM

## 2023-02-09 DIAGNOSIS — K21.9 GASTROESOPHAGEAL REFLUX DISEASE WITHOUT ESOPHAGITIS: Primary | ICD-10-CM

## 2023-02-09 PROCEDURE — 99214 OFFICE O/P EST MOD 30 MIN: CPT | Performed by: FAMILY MEDICINE

## 2023-02-09 RX ORDER — OMEPRAZOLE 40 MG/1
40 CAPSULE, DELAYED RELEASE ORAL
Qty: 90 CAPSULE | Refills: 1 | Status: SHIPPED | OUTPATIENT
Start: 2023-02-09

## 2023-02-09 RX ORDER — CLONAZEPAM 1 MG/1
1 TABLET ORAL 2 TIMES DAILY PRN
Qty: 60 TABLET | Refills: 2 | Status: SHIPPED | OUTPATIENT
Start: 2023-02-09

## 2023-02-10 NOTE — PROGRESS NOTES
Follow Up Office Visit      Date of Visit:  2023   Patient Name: Katie Nicolas  : 1955   MRN: [id number]     Chief Complaint:    Chief Complaint   Patient presents with   • Anxiety       History of Present Illness: Katie Nicolas is a 67 y.o. female who is here today for follow up.  Following up on her chronic anxiety medication.  Symptoms are controlled on current medication.  Bryn report appropriate.  Patient taking medications as directed.  No side effects of medication.  Patient also with some uncontrolled acid reflux symptoms.  No relief with over-the-counter medication.        Subjective      Review of Systems:   Review of Systems   Constitutional: Negative for fatigue and fever.   HENT: Negative for congestion and ear pain.    Respiratory: Negative for apnea, cough, chest tightness and shortness of breath.    Cardiovascular: Negative for chest pain.   Gastrointestinal: Positive for indigestion. Negative for abdominal pain, constipation, diarrhea and nausea.   Musculoskeletal: Negative for arthralgias.   Psychiatric/Behavioral: Negative for depressed mood and stress.       Past Medical History:   Past Medical History:   Diagnosis Date   • Anxiety    • Benign essential hypertension    • Chest pain, unspecified    • Depressive disorder    • Diverticulosis     noted on CT scan for Kidney stones   • GERD without esophagitis    • Hyperlipidemia, unspecified    • Kidney stones     other episodes too   • Paroxysmal supraventricular tachycardia (HCC)     bypass tract ablation   • Rheumatoid arthritis (HCC)     meds   • Sleep apnea 2006    cpap   • Ventricular tachycardia     The patient visits the office for an evaluationof ventricular tachcardia. Additional  information: No complaints, s/p ablation.       Past Surgical History:   Past Surgical History:   Procedure Laterality Date   •  SECTION         Family History:   Family History   Problem Relation Age of Onset   • Heart  disease Father    • Obesity Other        Social History:   Social History     Socioeconomic History   • Marital status: Single   Tobacco Use   • Smoking status: Never   • Smokeless tobacco: Never   • Tobacco comments:     UNKNOWN   Vaping Use   • Vaping Use: Never used   Substance and Sexual Activity   • Alcohol use: Yes     Comment: monthly   • Drug use: Never   • Sexual activity: Defer       Medications:     Current Outpatient Medications:   •  clonazePAM (KlonoPIN) 1 MG tablet, Take 1 tablet by mouth 2 (Two) Times a Day As Needed for Anxiety. Take two tablets by oral route daily at bedtime, Disp: 60 tablet, Rfl: 2  •  omeprazole (priLOSEC) 40 MG capsule, Take 1 capsule by mouth Every Morning Before Breakfast., Disp: 90 capsule, Rfl: 1  •  atorvastatin (LIPITOR) 10 MG tablet, Take 1 tablet by mouth Daily., Disp: 90 tablet, Rfl: 1  •  B Complex Vitamins (Vitamin B Complex) tablet, Take  by mouth Daily. Take one tablet by oral route daily, Disp: , Rfl:   •  Biotin 10 MG capsule, Take  by mouth. Take one tablet by oral route daily, Disp: , Rfl:   •  cholecalciferol (VITAMIN D3) 10 MCG (400 UNIT) tablet, Take 400 Units by mouth Daily., Disp: , Rfl:   •  coenzyme Q10 100 MG capsule, Take 200 mg by mouth Daily., Disp: , Rfl:   •  diphenhydrAMINE-acetaminophen (TYLENOL PM)  MG tablet per tablet, Take 1 tablet by mouth At Night As Needed for Sleep., Disp: , Rfl:   •  Melatonin 200 MCG tablet, Take  by mouth., Disp: , Rfl:   •  metoprolol tartrate (LOPRESSOR) 50 MG tablet, Take 1 tablet by mouth 2 (Two) Times a Day., Disp: 180 tablet, Rfl: 3  •  MILK THISTLE PO, Take  by mouth., Disp: , Rfl:   •  naproxen (NAPROSYN) 375 MG tablet, TAKE ONE TABLET BY MOUTH TWICE A DAY WITH FOOD, Disp: 180 tablet, Rfl: 0  •  Tofacitinib Citrate ER (Xeljanz XR) 11 MG tablet sustained-release 24 hour,  mg Tab, Oral, Daily, 0 Refill(s), Disp: , Rfl:   •  valsartan (DIOVAN) 160 MG tablet, Take 1 tablet by mouth Daily., Disp: 90 tablet,  "Rfl: 3    Allergies:   Allergies   Allergen Reactions   • Niacin Nausea And Vomiting       Objective     Physical Exam:  Vital Signs:   Vitals:    02/09/23 1025   BP: 140/80   Pulse: 67   SpO2: 98%   Weight: 90.3 kg (199 lb)   Height: 157.5 cm (62\")     Body mass index is 36.4 kg/m².     Physical Exam  Vitals and nursing note reviewed.   Constitutional:       General: She is not in acute distress.     Appearance: Normal appearance. She is not ill-appearing.   HENT:      Head: Normocephalic and atraumatic.      Right Ear: Tympanic membrane and ear canal normal.      Left Ear: Tympanic membrane and ear canal normal.      Nose: Nose normal.   Cardiovascular:      Rate and Rhythm: Normal rate and regular rhythm.      Heart sounds: Normal heart sounds.   Pulmonary:      Effort: Pulmonary effort is normal.      Breath sounds: Normal breath sounds.   Neurological:      Mental Status: She is alert and oriented to person, place, and time. Mental status is at baseline.   Psychiatric:         Mood and Affect: Mood normal.         Procedures      Assessment / Plan      Assessment/Plan:   Diagnoses and all orders for this visit:    1. Gastroesophageal reflux disease without esophagitis (Primary)    2. Anxiety  -     clonazePAM (KlonoPIN) 1 MG tablet; Take 1 tablet by mouth 2 (Two) Times a Day As Needed for Anxiety. Take two tablets by oral route daily at bedtime  Dispense: 60 tablet; Refill: 2    Other orders  -     omeprazole (priLOSEC) 40 MG capsule; Take 1 capsule by mouth Every Morning Before Breakfast.  Dispense: 90 capsule; Refill: 1         Continue current medication for anxiety.  Bryn report appropriate.  Medication refills given.  Use omeprazole for her acid reflux.  Prescription given today.    Follow Up:   No follow-ups on file.    Kalen Leslie  [facility] [place]   "

## 2023-04-24 RX ORDER — NAPROXEN 375 MG/1
TABLET ORAL
Qty: 180 TABLET | Refills: 0 | Status: SHIPPED | OUTPATIENT
Start: 2023-04-24

## 2023-05-03 ENCOUNTER — OFFICE VISIT (OUTPATIENT)
Dept: FAMILY MEDICINE CLINIC | Facility: CLINIC | Age: 68
End: 2023-05-03
Payer: MEDICARE

## 2023-05-03 VITALS
DIASTOLIC BLOOD PRESSURE: 90 MMHG | HEIGHT: 62 IN | HEART RATE: 63 BPM | OXYGEN SATURATION: 96 % | WEIGHT: 199 LBS | BODY MASS INDEX: 36.62 KG/M2 | SYSTOLIC BLOOD PRESSURE: 156 MMHG

## 2023-05-03 DIAGNOSIS — F41.9 ANXIETY: ICD-10-CM

## 2023-05-03 DIAGNOSIS — E66.01 MORBID (SEVERE) OBESITY DUE TO EXCESS CALORIES: Primary | ICD-10-CM

## 2023-05-03 RX ORDER — CLONAZEPAM 1 MG/1
1 TABLET ORAL 2 TIMES DAILY PRN
Qty: 60 TABLET | Refills: 2 | Status: SHIPPED | OUTPATIENT
Start: 2023-05-03

## 2023-05-03 RX ORDER — SEMAGLUTIDE 0.25 MG/.5ML
0.25 INJECTION, SOLUTION SUBCUTANEOUS WEEKLY
Qty: 2 ML | Refills: 1 | Status: SHIPPED | OUTPATIENT
Start: 2023-05-03

## 2023-05-03 NOTE — PROGRESS NOTES
Follow Up Office Visit      Date of Visit:  2023   Patient Name: Katie Nicolas  : 1955   MRN: [id number]     Chief Complaint:    Chief Complaint   Patient presents with   • Anxiety       History of Present Illness: Katie Nicolas is a 67 y.o. female who is here today for follow up.  Patient comes in for refills on anxiety medication.  Medicine controlled.  Bryn report appropriate.  Patient taking medications as directed.  Patient also wanted to have a discussion today regarding her weight.  This has increased over time.  She is struggling with diet and exercise.        Subjective      Review of Systems:   Review of Systems   Constitutional: Negative for fatigue and fever.   HENT: Negative for congestion and ear pain.    Respiratory: Negative for apnea, cough, chest tightness and shortness of breath.    Cardiovascular: Negative for chest pain.   Gastrointestinal: Negative for abdominal pain, constipation, diarrhea and nausea.   Musculoskeletal: Negative for arthralgias.   Psychiatric/Behavioral: Negative for depressed mood and stress.       Past Medical History:   Past Medical History:   Diagnosis Date   • Anxiety    • Benign essential hypertension    • Chest pain, unspecified    • Depressive disorder    • Diverticulosis     noted on CT scan for Kidney stones   • GERD without esophagitis    • Hyperlipidemia, unspecified    • Kidney stones     other episodes too   • Paroxysmal supraventricular tachycardia     bypass tract ablation   • Rheumatoid arthritis     meds   • Sleep apnea 2006    cpap   • Ventricular tachycardia     The patient visits the office for an evaluationof ventricular tachcardia. Additional  information: No complaints, s/p ablation.       Past Surgical History:   Past Surgical History:   Procedure Laterality Date   •  SECTION         Family History:   Family History   Problem Relation Age of Onset   • Heart disease Father    • Obesity Other        Social History:    Social History     Socioeconomic History   • Marital status: Single   Tobacco Use   • Smoking status: Never   • Smokeless tobacco: Never   • Tobacco comments:     UNKNOWN   Vaping Use   • Vaping Use: Never used   Substance and Sexual Activity   • Alcohol use: Yes     Comment: monthly   • Drug use: Never   • Sexual activity: Defer       Medications:     Current Outpatient Medications:   •  clonazePAM (KlonoPIN) 1 MG tablet, Take 1 tablet by mouth 2 (Two) Times a Day As Needed for Anxiety. Take two tablets by oral route daily at bedtime, Disp: 60 tablet, Rfl: 2  •  atorvastatin (LIPITOR) 10 MG tablet, Take 1 tablet by mouth Daily., Disp: 90 tablet, Rfl: 1  •  B Complex Vitamins (Vitamin B Complex) tablet, Take  by mouth Daily. Take one tablet by oral route daily, Disp: , Rfl:   •  Biotin 10 MG capsule, Take  by mouth. Take one tablet by oral route daily, Disp: , Rfl:   •  cholecalciferol (VITAMIN D3) 10 MCG (400 UNIT) tablet, Take 400 Units by mouth Daily., Disp: , Rfl:   •  coenzyme Q10 100 MG capsule, Take 200 mg by mouth Daily., Disp: , Rfl:   •  diphenhydrAMINE-acetaminophen (TYLENOL PM)  MG tablet per tablet, Take 1 tablet by mouth At Night As Needed for Sleep., Disp: , Rfl:   •  Melatonin 200 MCG tablet, Take  by mouth., Disp: , Rfl:   •  metoprolol tartrate (LOPRESSOR) 50 MG tablet, Take 1 tablet by mouth 2 (Two) Times a Day., Disp: 180 tablet, Rfl: 3  •  MILK THISTLE PO, Take  by mouth., Disp: , Rfl:   •  naproxen (NAPROSYN) 375 MG tablet, TAKE ONE TABLET BY MOUTH TWICE A DAY WITH FOOD, Disp: 180 tablet, Rfl: 0  •  omeprazole (priLOSEC) 40 MG capsule, Take 1 capsule by mouth Every Morning Before Breakfast., Disp: 90 capsule, Rfl: 1  •  Semaglutide-Weight Management (Wegovy) 0.25 MG/0.5ML solution auto-injector, Inject 0.25 mg under the skin into the appropriate area as directed 1 (One) Time Per Week., Disp: 2 mL, Rfl: 1  •  Tofacitinib Citrate ER (Xeljanz XR) 11 MG tablet sustained-release 24 hour,  mg  "Tab, Oral, Daily, 0 Refill(s), Disp: , Rfl:   •  valsartan (DIOVAN) 160 MG tablet, Take 1 tablet by mouth Daily., Disp: 90 tablet, Rfl: 3    Allergies:   Allergies   Allergen Reactions   • Niacin Nausea And Vomiting       Objective     Physical Exam:  Vital Signs:   Vitals:    05/03/23 1036   BP: 156/90   Pulse: 63   SpO2: 96%   Weight: 90.3 kg (199 lb)   Height: 157.5 cm (62\")     Body mass index is 36.4 kg/m².     Physical Exam  Vitals and nursing note reviewed.   Constitutional:       General: She is not in acute distress.     Appearance: Normal appearance. She is not ill-appearing.   HENT:      Head: Normocephalic and atraumatic.      Right Ear: Tympanic membrane and ear canal normal.      Left Ear: Tympanic membrane and ear canal normal.      Nose: Nose normal.   Cardiovascular:      Rate and Rhythm: Normal rate and regular rhythm.      Heart sounds: Normal heart sounds.   Pulmonary:      Effort: Pulmonary effort is normal.      Breath sounds: Normal breath sounds.   Neurological:      Mental Status: She is alert and oriented to person, place, and time. Mental status is at baseline.   Psychiatric:         Mood and Affect: Mood normal.         Procedures      Assessment / Plan      Assessment/Plan:   Diagnoses and all orders for this visit:    1. Morbid (severe) obesity due to excess calories (Primary)    2. Anxiety  -     clonazePAM (KlonoPIN) 1 MG tablet; Take 1 tablet by mouth 2 (Two) Times a Day As Needed for Anxiety. Take two tablets by oral route daily at bedtime  Dispense: 60 tablet; Refill: 2    Other orders  -     Semaglutide-Weight Management (Wegovy) 0.25 MG/0.5ML solution auto-injector; Inject 0.25 mg under the skin into the appropriate area as directed 1 (One) Time Per Week.  Dispense: 2 mL; Refill: 1         Overall patient struggling with weight.  Has tried a lot of things with diet and exercise.  Appetite still struggles with.  Trial of Wegovy.  Discussed diet and exercises more.  Refilled " medication for anxiety.    Follow Up:   No follow-ups on file.    Kalen Leslie  [facility] [place]

## 2023-05-04 ENCOUNTER — TELEPHONE (OUTPATIENT)
Dept: FAMILY MEDICINE CLINIC | Facility: CLINIC | Age: 68
End: 2023-05-04
Payer: MEDICARE

## 2023-05-04 RX ORDER — SEMAGLUTIDE 0.25 MG/.5ML
0.25 INJECTION, SOLUTION SUBCUTANEOUS WEEKLY
Qty: 2 ML | Refills: 1 | OUTPATIENT
Start: 2023-05-04

## 2023-05-04 NOTE — TELEPHONE ENCOUNTER
Caller: Katie Nicolas    Relationship: Self    Best call back number: [phone]    Requested Prescriptions     Pending Prescriptions Disp Refills   • Semaglutide-Weight Management (Wegovy) 0.25 MG/0.5ML solution auto-injector 2 mL 1     Sig: Inject 0.25 mg under the skin into the appropriate area as directed 1 (One) Time Per Week.        Pharmacy where request should be sent: [facility] [id number] [place] [id number] [facility] [address] - [phone]  - [phone] FX     Last office visit with prescribing clinician: 5/3/2023   Last telemedicine visit with prescribing clinician: 8/3/2023   Next office visit with prescribing clinician: 8/3/2023     Additional details provided by patient: PATIENT STATES HER OUT OF POCKET FOR THERE MEDICATION WAS $1400 SO SHE CANCELED THE PRESCRIPTION AND WILL NOT BE TAKING IT. SHE STATES SHE KNOWS IT CANT BE REPLACED BY ANYTHING AND SHE HAS NO ISSUES WITH THAT.     Romeo Mi Rep   05/04/23 12:02 EDT

## 2023-05-22 ENCOUNTER — TELEPHONE (OUTPATIENT)
Dept: FAMILY MEDICINE CLINIC | Facility: CLINIC | Age: 68
End: 2023-05-22
Payer: MEDICARE

## 2023-05-22 NOTE — TELEPHONE ENCOUNTER
Caller: Katie Nicolas    Relationship: Self    Best call back number: [phone]    What is the best time to reach you: ANY    Who are you requesting to speak with (clinical staff, provider,  specific staff member): CLINICAL    What was the call regarding: THE PATIENT WOULD LIKE A CALL BACK TO DISCUSS WHAT SHE CAN DO IN REGARDS TO NOT HAVING A BOWEL MOVEMENT IN THE PAST FOUR DAYS.     Do you require a callback: YES, PLEASE, AS SOON AS POSSIBLE.    THANK YOU.

## 2023-05-25 NOTE — TELEPHONE ENCOUNTER
Please check on her.  Just getting to messages with our volume.   See what she has tried.  I would typically either do a bottle of mag citrate or do 4 scoops miralax with 32 ounces liquid and repeat 4 hours later

## 2023-06-12 ENCOUNTER — OFFICE VISIT (OUTPATIENT)
Dept: CARDIOLOGY | Facility: CLINIC | Age: 68
End: 2023-06-12
Payer: MEDICARE

## 2023-06-12 VITALS
HEART RATE: 68 BPM | RESPIRATION RATE: 18 BRPM | SYSTOLIC BLOOD PRESSURE: 144 MMHG | TEMPERATURE: 98 F | DIASTOLIC BLOOD PRESSURE: 82 MMHG | HEIGHT: 62 IN | OXYGEN SATURATION: 98 % | WEIGHT: 200 LBS | BODY MASS INDEX: 36.8 KG/M2

## 2023-06-12 DIAGNOSIS — Z91.89 CHRONIC CHEST PAIN WITH LOW TO MODERATE RISK FOR CAD: ICD-10-CM

## 2023-06-12 DIAGNOSIS — I10 PRIMARY HYPERTENSION: Primary | ICD-10-CM

## 2023-06-12 DIAGNOSIS — E78.5 HYPERLIPIDEMIA LDL GOAL <100: ICD-10-CM

## 2023-06-12 DIAGNOSIS — I47.29 VENTRICULAR TACHYCARDIA (PAROXYSMAL): ICD-10-CM

## 2023-06-12 DIAGNOSIS — R07.9 CHRONIC CHEST PAIN WITH LOW TO MODERATE RISK FOR CAD: ICD-10-CM

## 2023-06-12 DIAGNOSIS — G89.29 CHRONIC CHEST PAIN WITH LOW TO MODERATE RISK FOR CAD: ICD-10-CM

## 2023-06-12 PROCEDURE — 3079F DIAST BP 80-89 MM HG: CPT | Performed by: INTERNAL MEDICINE

## 2023-06-12 PROCEDURE — 3077F SYST BP >= 140 MM HG: CPT | Performed by: INTERNAL MEDICINE

## 2023-06-12 PROCEDURE — 1160F RVW MEDS BY RX/DR IN RCRD: CPT | Performed by: INTERNAL MEDICINE

## 2023-06-12 PROCEDURE — 1159F MED LIST DOCD IN RCRD: CPT | Performed by: INTERNAL MEDICINE

## 2023-06-12 PROCEDURE — 93000 ELECTROCARDIOGRAM COMPLETE: CPT | Performed by: INTERNAL MEDICINE

## 2023-06-12 PROCEDURE — 99214 OFFICE O/P EST MOD 30 MIN: CPT | Performed by: INTERNAL MEDICINE

## 2023-06-12 NOTE — PROGRESS NOTES
[facility]  [facility]OLOG[facility]  [address] [place] [address]  Dept: [phone]  Dept Fax: [phone]    Katie Nicolas  1955    Follow Up Office Visit Note    History of Present Illness:  Katie Nicolas is a 67 y.o. female who presents to the clinic for Follow-up. Ventricular tachycardia - S/p ablation., no palpitations EKG sinus HR 61 RBBB-BP is 120.70    The following portions of the patient's history were reviewed and updated as appropriate: allergies, current medications, past family history, past medical history, past social history, past surgical history, and problem list.    Medications:  atorvastatin  Biotin capsule  cholecalciferol  clonazePAM  coenzyme Q10  diphenhydrAMINE-acetaminophen tablet  Melatonin tablet  metoprolol tartrate  MILK THISTLE PO  naproxen  omeprazole  valsartan  Vitamin B Complex tablet  Wegovy solution auto-injector  Xeljanz XR tablet sustained-release 24 hour    Subjective  Allergies   Allergen Reactions   • Niacin Nausea And Vomiting        Past Medical History:   Diagnosis Date   • Anxiety    • Benign essential hypertension    • Chest pain, unspecified    • Depressive disorder    • Diverticulosis     noted on CT scan for Kidney stones   • GERD without esophagitis    • Hyperlipidemia, unspecified    • Kidney stones     other episodes too   • Paroxysmal supraventricular tachycardia     bypass tract ablation   • Rheumatoid arthritis     meds   • Sleep apnea 2006    cpap   • Ventricular tachycardia     The patient visits the office for an evaluationof ventricular tachcardia. Additional  information: No complaints, s/p ablation.       Past Surgical History:   Procedure Laterality Date   •  SECTION         Family History   Problem Relation Age of Onset   • Heart disease Father    • Obesity Other         Social History     Socioeconomic History   • Marital status: Single   Tobacco Use   • Smoking status: Never   • Smokeless  "tobacco: Never   • Tobacco comments:     UNKNOWN   Vaping Use   • Vaping Use: Never used   Substance and Sexual Activity   • Alcohol use: Yes     Comment: monthly   • Drug use: Never   • Sexual activity: Defer       Review of Systems   Constitutional: Negative.    HENT: Negative.     Respiratory: Negative.     Cardiovascular: Negative.    Endocrine: Negative.    Genitourinary: Negative.    Musculoskeletal: Negative.    Skin: Negative.    Allergic/Immunologic: Negative.    Neurological: Negative.    Hematological: Negative.    Psychiatric/Behavioral: Negative.       Cardiovascular Procedures    ECHO/MUGA:  STRESS TESTS:   CARDIAC CATH:   DEVICES:   HOLTER:   CT/MRI:   VASCULAR:   CARDIOTHORACIC:     Objective  Vitals:    06/12/23 1251   BP: 144/82   BP Location: Right arm   Patient Position: Lying   Cuff Size: Adult   Pulse: 68   Resp: 18   Temp: 98 °F (36.7 °C)   TempSrc: Infrared   SpO2: 98%   Weight: 90.7 kg (200 lb)   Height: 157.5 cm (62\")   PainSc: 0-No pain     Body mass index is 36.58 kg/m².     Physical Exam  Vitals reviewed.   Constitutional:       Appearance: Healthy appearance. Not in distress.   Neck:      Vascular: No JVR. JVD normal.   Pulmonary:      Effort: Pulmonary effort is normal.      Breath sounds: Normal breath sounds. No wheezing. No rhonchi. No rales.   Chest:      Chest wall: Not tender to palpatation.   Cardiovascular:      PMI at left midclavicular line. Normal rate. Regular rhythm. Normal S1. Normal S2.       Murmurs: There is no murmur.      No gallop.  No click. No rub.   Pulses:     Intact distal pulses.   Edema:     Peripheral edema absent.   Abdominal:      General: Bowel sounds are normal.      Palpations: Abdomen is soft.      Tenderness: There is no abdominal tenderness.   Musculoskeletal: Normal range of motion.         General: No tenderness. Skin:     General: Skin is warm and dry.   Neurological:      General: No focal deficit present.      Mental Status: Alert and oriented " to person, place and time.        Diagnostic Data    ECG 12 Lead    Date/Time: 6/12/2023 1:14 PM  Performed by: Héctor Dumont MD  Authorized by: Héctor Dumont MD   Comparison: compared with previous ECG from 4/16/2022  Similar to previous ECG  Rhythm: sinus rhythm  Rate: normal  BPM: 61  Conduction: right bundle branch block and 1st degree AV block  QRS axis: normal  Other findings: non-specific ST-T wave changes    Clinical impression: abnormal EKG      Assessment and Plan  Diagnoses and all orders for this visit:    Primary hypertension- BP is 125.80, will keep same meds Metoprolol 50 mg bid and Valsartan 160 mg    Chronic chest pain with low to moderate risk for CAD- No longer has chest pain    Hyperlipidemia LDL goal <100- On Lipitor, tolerates well will need a Lipid profile    Ventricular tachycardia (paroxysmal)- s.p ablation doing good         No follow-ups on file.    Héctor Dumont MD  06/12/2023

## 2023-08-03 ENCOUNTER — OFFICE VISIT (OUTPATIENT)
Dept: FAMILY MEDICINE CLINIC | Facility: CLINIC | Age: 68
End: 2023-08-03
Payer: MEDICARE

## 2023-08-03 VITALS
HEART RATE: 79 BPM | OXYGEN SATURATION: 98 % | BODY MASS INDEX: 36.8 KG/M2 | HEIGHT: 62 IN | DIASTOLIC BLOOD PRESSURE: 92 MMHG | SYSTOLIC BLOOD PRESSURE: 130 MMHG | WEIGHT: 200 LBS

## 2023-08-03 DIAGNOSIS — F41.9 ANXIETY: ICD-10-CM

## 2023-08-03 DIAGNOSIS — M54.9 ACUTE BILATERAL BACK PAIN, UNSPECIFIED BACK LOCATION: Primary | ICD-10-CM

## 2023-08-03 PROCEDURE — 3080F DIAST BP >= 90 MM HG: CPT | Performed by: FAMILY MEDICINE

## 2023-08-03 PROCEDURE — 99214 OFFICE O/P EST MOD 30 MIN: CPT | Performed by: FAMILY MEDICINE

## 2023-08-03 PROCEDURE — 3075F SYST BP GE 130 - 139MM HG: CPT | Performed by: FAMILY MEDICINE

## 2023-08-03 RX ORDER — CLONAZEPAM 1 MG/1
1 TABLET ORAL 2 TIMES DAILY PRN
Qty: 60 TABLET | Refills: 2 | Status: SHIPPED | OUTPATIENT
Start: 2023-08-03

## 2023-08-03 RX ORDER — TIZANIDINE 4 MG/1
4 TABLET ORAL NIGHTLY PRN
Qty: 30 TABLET | Refills: 1 | Status: SHIPPED | OUTPATIENT
Start: 2023-08-03

## 2023-08-18 RX ORDER — NAPROXEN 375 MG/1
TABLET ORAL
Qty: 180 TABLET | Refills: 0 | Status: SHIPPED | OUTPATIENT
Start: 2023-08-18

## 2023-09-06 ENCOUNTER — TELEPHONE (OUTPATIENT)
Dept: FAMILY MEDICINE CLINIC | Facility: CLINIC | Age: 68
End: 2023-09-06
Payer: MEDICARE

## 2023-09-06 DIAGNOSIS — R05.1 ACUTE COUGH: Primary | ICD-10-CM

## 2023-09-06 NOTE — TELEPHONE ENCOUNTER
Pt has a bad cough and refuses to go to [facility] or er. We do not have anything available with anyone. Pt would like for you to rx cough syrup w/ codeine in it to [facility]

## 2023-09-07 RX ORDER — GUAIFENESIN AND CODEINE PHOSPHATE 100; 10 MG/5ML; MG/5ML
5 SOLUTION ORAL 3 TIMES DAILY PRN
Qty: 120 ML | Refills: 0 | Status: SHIPPED | OUTPATIENT
Start: 2023-09-07

## 2023-09-08 ENCOUNTER — OFFICE VISIT (OUTPATIENT)
Dept: FAMILY MEDICINE CLINIC | Facility: CLINIC | Age: 68
End: 2023-09-08
Payer: MEDICARE

## 2023-09-08 VITALS
SYSTOLIC BLOOD PRESSURE: 124 MMHG | BODY MASS INDEX: 36.8 KG/M2 | DIASTOLIC BLOOD PRESSURE: 82 MMHG | OXYGEN SATURATION: 98 % | WEIGHT: 200 LBS | HEIGHT: 62 IN | HEART RATE: 85 BPM

## 2023-09-08 DIAGNOSIS — J40 BRONCHITIS: Primary | ICD-10-CM

## 2023-09-08 PROCEDURE — 3079F DIAST BP 80-89 MM HG: CPT | Performed by: NURSE PRACTITIONER

## 2023-09-08 PROCEDURE — 99213 OFFICE O/P EST LOW 20 MIN: CPT | Performed by: NURSE PRACTITIONER

## 2023-09-08 PROCEDURE — 1160F RVW MEDS BY RX/DR IN RCRD: CPT | Performed by: NURSE PRACTITIONER

## 2023-09-08 PROCEDURE — 1159F MED LIST DOCD IN RCRD: CPT | Performed by: NURSE PRACTITIONER

## 2023-09-08 PROCEDURE — 3074F SYST BP LT 130 MM HG: CPT | Performed by: NURSE PRACTITIONER

## 2023-09-08 RX ORDER — PREDNISONE 20 MG/1
TABLET ORAL
Qty: 18 TABLET | Refills: 0 | Status: SHIPPED | OUTPATIENT
Start: 2023-09-08 | End: 2023-09-17

## 2023-09-08 NOTE — PROGRESS NOTES
"Chief Complaint  Cough (Went to first care yesterday. Neg for flu/covid. )    Subjective          Katie Nicolas presents to [facility]  History of Present Illness    Patient states for 2 weeks she has had a productive cough.  No fever no chills no body aches.  No chest pain no chest pressure no shortness of breath but has noticed wheezing.  No smoking.  No sick contacts that she is aware of.  States she went to urgent care yesterday and they prescribed her Augmentin and Promethazine DM.  She states she also has codeine cough medication at home that she has been using instead of the Promethazine DM.  She states she tested negative for flu and COVID.  No GI issues.    Objective   Vital Signs:   /82   Pulse 85   Ht 157.5 cm (62.01\")   Wt 90.7 kg (200 lb)   SpO2 98%   BMI 36.57 kg/m²     Body mass index is 36.57 kg/m².    Review of Systems   Constitutional:  Negative for chills, fatigue and fever.   HENT:  Positive for congestion. Negative for sinus pressure, sneezing, sore throat, swollen glands and trouble swallowing.    Eyes:  Negative for visual disturbance.   Respiratory:  Positive for cough and wheezing. Negative for shortness of breath.    Cardiovascular: Negative.    Gastrointestinal:  Negative for abdominal pain, diarrhea, nausea and vomiting.   Genitourinary:  Negative for dysuria and frequency.   Musculoskeletal:  Negative for back pain.   Neurological:  Negative for headache.     Past History:  Medical History: has a past medical history of Anxiety, Benign essential hypertension, Chest pain, unspecified, Depressive disorder, Diverticulosis (), GERD without esophagitis, Hyperlipidemia, unspecified, Kidney stones (), Paroxysmal supraventricular tachycardia, Rheumatoid arthritis, Sleep apnea (), and Ventricular tachycardia.   Surgical History: has a past surgical history that includes  section.   Family History: family history includes Heart disease " in her father; Obesity in an other family member.   Social History: reports that she has never smoked. She has never used smokeless tobacco. She reports current alcohol use. She reports that she does not use drugs.    PHQ-2 Depression Screening  Little interest or pleasure in doing things?     Feeling down, depressed, or hopeless?     PHQ-2 Total Score          PHQ-9 Depression Screening  Little interest or pleasure in doing things?     Feeling down, depressed, or hopeless?     Trouble falling or staying asleep, or sleeping too much?     Feeling tired or having little energy?     Poor appetite or overeating?     Feeling bad about yourself - or that you are a failure or have let yourself or your family down?     Trouble concentrating on things, such as reading the newspaper or watching television?     Moving or speaking so slowly that other people could have noticed? Or the opposite - being so fidgety or restless that you have been moving around a lot more than usual?     Thoughts that you would be better off dead, or of hurting yourself in some way?     PHQ-9 Total Score     If you checked off any problems, how difficult have these problems made it for you to do your work, take care of things at home, or get along with other people?       PHQ-9 Total Score:        Patient screened positive for depression based on a PHQ-9 score of 0 on 5/3/2023. Follow-up recommendations include:          Current Outpatient Medications:     atorvastatin (LIPITOR) 10 MG tablet, Take 1 tablet by mouth Daily., Disp: 90 tablet, Rfl: 1    B Complex Vitamins (Vitamin B Complex) tablet, Take  by mouth Daily. Take one tablet by oral route daily, Disp: , Rfl:     Biotin 10 MG capsule, Take  by mouth. Take one tablet by oral route daily, Disp: , Rfl:     cholecalciferol (VITAMIN D3) 10 MCG (400 UNIT) tablet, Take 1 tablet by mouth Daily., Disp: , Rfl:     clonazePAM (KlonoPIN) 1 MG tablet, Take 1 tablet by mouth 2 (Two) Times a Day As Needed  for Anxiety. Take two tablets by oral route daily at bedtime, Disp: 60 tablet, Rfl: 2    coenzyme Q10 100 MG capsule, Take 2 capsules by mouth Daily., Disp: , Rfl:     diphenhydrAMINE-acetaminophen (TYLENOL PM)  MG tablet per tablet, Take 1 tablet by mouth At Night As Needed for Sleep., Disp: , Rfl:     guaiFENesin-codeine (GUAIFENESIN AC) 100-10 MG/5ML liquid, Take 5 mL by mouth 3 (Three) Times a Day As Needed for Cough., Disp: 120 mL, Rfl: 0    Melatonin 200 MCG tablet, Take  by mouth., Disp: , Rfl:     metoprolol tartrate (LOPRESSOR) 50 MG tablet, Take 1 tablet by mouth 2 (Two) Times a Day., Disp: 180 tablet, Rfl: 3    MILK THISTLE PO, Take  by mouth., Disp: , Rfl:     naproxen (NAPROSYN) 375 MG tablet, TAKE ONE TABLET BY MOUTH TWICE A DAY WITH FOOD, Disp: 180 tablet, Rfl: 0    omeprazole (priLOSEC) 40 MG capsule, Take 1 capsule by mouth Every Morning Before Breakfast., Disp: 90 capsule, Rfl: 1    Semaglutide-Weight Management (Wegovy) 0.25 MG/0.5ML solution auto-injector, Inject 0.25 mg under the skin into the appropriate area as directed 1 (One) Time Per Week., Disp: 2 mL, Rfl: 1    tiZANidine (ZANAFLEX) 4 MG tablet, Take 1 tablet by mouth At Night As Needed for Muscle Spasms., Disp: 30 tablet, Rfl: 1    Tofacitinib Citrate ER (Xeljanz XR) 11 MG tablet sustained-release 24 hour,  mg Tab, Oral, Daily, 0 Refill(s), Disp: , Rfl:     valsartan (DIOVAN) 160 MG tablet, Take 1 tablet by mouth Daily., Disp: 90 tablet, Rfl: 3    predniSONE (DELTASONE) 20 MG tablet, Take 3 tablets by mouth Daily for 3 days, THEN 2 tablets Daily for 3 days, THEN 1 tablet Daily for 3 days., Disp: 18 tablet, Rfl: 0   (Not in a hospital admission)     Allergies: Niacin    Physical Exam  Constitutional:       Appearance: Normal appearance.   HENT:      Right Ear: Tympanic membrane, ear canal and external ear normal.      Left Ear: Tympanic membrane, ear canal and external ear normal.      Nose: Nose normal.      Mouth/Throat:       Mouth: Mucous membranes are moist.      Pharynx: Oropharynx is clear.   Cardiovascular:      Rate and Rhythm: Normal rate and regular rhythm.      Heart sounds: Normal heart sounds.   Pulmonary:      Effort: Pulmonary effort is normal. No respiratory distress.      Breath sounds: Wheezing present. No rhonchi or rales.   Neurological:      General: No focal deficit present.      Mental Status: She is alert and oriented to person, place, and time. Mental status is at baseline.   Psychiatric:         Mood and Affect: Mood normal.         Behavior: Behavior normal.         Thought Content: Thought content normal.         Judgment: Judgment normal.        Result Review :                   Assessment and Plan    Diagnoses and all orders for this visit:    1. Bronchitis (Primary)  Assessment & Plan:  Continue with Augmentin.  Tylenol as needed for pain fever.  Continue with cough medication as needed.  We will add prednisone.  Avoid NSAIDs while on prednisone.  Risk of meds discussed understood.  Education provided.  Chest x-ray completed.  Will let know if radiologist sees anything.  Go to ED if worsens.  Return in 2 days if no improvement.  Return to clinic or ED with any issues or concerns.    Orders:  -     predniSONE (DELTASONE) 20 MG tablet; Take 3 tablets by mouth Daily for 3 days, THEN 2 tablets Daily for 3 days, THEN 1 tablet Daily for 3 days.  Dispense: 18 tablet; Refill: 0  -     XR Chest PA & Lateral (In Office)                     Follow Up   Return if symptoms worsen or fail to improve.  Patient was given instructions and counseling regarding her condition or for health maintenance advice. Please see specific information pulled into the AVS if appropriate.     AUGUSTINE Cole

## 2023-09-08 NOTE — ASSESSMENT & PLAN NOTE
Continue with Augmentin.  Tylenol as needed for pain fever.  Continue with cough medication as needed.  We will add prednisone.  Avoid NSAIDs while on prednisone.  Risk of meds discussed understood.  Education provided.  Chest x-ray completed.  Will let know if radiologist sees anything.  Go to ED if worsens.  Return in 2 days if no improvement.  Return to clinic or ED with any issues or concerns.

## 2023-10-25 ENCOUNTER — OFFICE VISIT (OUTPATIENT)
Dept: FAMILY MEDICINE CLINIC | Facility: CLINIC | Age: 68
End: 2023-10-25
Payer: MEDICARE

## 2023-10-25 VITALS
HEIGHT: 62 IN | SYSTOLIC BLOOD PRESSURE: 170 MMHG | BODY MASS INDEX: 36.71 KG/M2 | HEART RATE: 58 BPM | OXYGEN SATURATION: 96 % | DIASTOLIC BLOOD PRESSURE: 102 MMHG | WEIGHT: 199.5 LBS

## 2023-10-25 DIAGNOSIS — M54.9 ACUTE BILATERAL BACK PAIN, UNSPECIFIED BACK LOCATION: ICD-10-CM

## 2023-10-25 DIAGNOSIS — J40 BRONCHITIS: Primary | ICD-10-CM

## 2023-10-25 DIAGNOSIS — F41.9 ANXIETY: ICD-10-CM

## 2023-10-25 PROCEDURE — 3080F DIAST BP >= 90 MM HG: CPT | Performed by: FAMILY MEDICINE

## 2023-10-25 PROCEDURE — 3077F SYST BP >= 140 MM HG: CPT | Performed by: FAMILY MEDICINE

## 2023-10-25 PROCEDURE — 99214 OFFICE O/P EST MOD 30 MIN: CPT | Performed by: FAMILY MEDICINE

## 2023-10-25 RX ORDER — OMEPRAZOLE 40 MG/1
40 CAPSULE, DELAYED RELEASE ORAL
Qty: 90 CAPSULE | Refills: 1 | Status: SHIPPED | OUTPATIENT
Start: 2023-10-25

## 2023-10-25 RX ORDER — TRIAMCINOLONE ACETONIDE 40 MG/ML
40 INJECTION, SUSPENSION INTRA-ARTICULAR; INTRAMUSCULAR ONCE
Status: SHIPPED | OUTPATIENT
Start: 2023-10-25

## 2023-10-25 RX ORDER — CLONAZEPAM 1 MG/1
1 TABLET ORAL 2 TIMES DAILY PRN
Qty: 60 TABLET | Refills: 2 | Status: SHIPPED | OUTPATIENT
Start: 2023-10-25

## 2023-10-25 RX ORDER — PREDNISONE 20 MG/1
TABLET ORAL
Qty: 18 TABLET | Refills: 0 | Status: SHIPPED | OUTPATIENT
Start: 2023-10-25

## 2023-10-26 NOTE — PROGRESS NOTES
Follow Up Office Visit      Date of Visit:  10/25/2023   Patient Name: Katie Nicolas  : 1955   MRN: [id number]     Chief Complaint:    Chief Complaint   Patient presents with    Back Pain     No Improvement     Cough    Conjunctivitis       History of Present Illness: Katie Nicolas is a 68 y.o. female who is here today for follow up.  Patient here with some bronchial issues.  Has had infection recently.  Still some wheezing and cough.  Patient also having some acute back pain.  Patient also needs refills on medication takes for anxiety.  Condition stable.        Subjective      Review of Systems:   Review of Systems   Constitutional:  Negative for fatigue and fever.   HENT:  Negative for congestion and ear pain.    Respiratory:  Positive for cough and wheezing. Negative for apnea, chest tightness and shortness of breath.    Cardiovascular:  Negative for chest pain.   Gastrointestinal:  Negative for abdominal pain, constipation, diarrhea and nausea.   Musculoskeletal:  Negative for arthralgias.   Psychiatric/Behavioral:  Negative for depressed mood and stress.        Past Medical History:   Past Medical History:   Diagnosis Date    Anxiety     Benign essential hypertension     Chest pain, unspecified     Depressive disorder     Diverticulosis     noted on CT scan for Kidney stones    GERD without esophagitis     Hyperlipidemia, unspecified     Kidney stones 2005    other episodes too    Paroxysmal supraventricular tachycardia     bypass tract ablation    Rheumatoid arthritis     meds    Sleep apnea 2006    cpap    Ventricular tachycardia     The patient visits the office for an evaluationof ventricular tachcardia. Additional  information: No complaints, s/p ablation.       Past Surgical History:   Past Surgical History:   Procedure Laterality Date     SECTION         Family History:   Family History   Problem Relation Age of Onset    Heart disease Father     Obesity Other        Social  History:   Social History     Socioeconomic History    Marital status: Single   Tobacco Use    Smoking status: Never    Smokeless tobacco: Never    Tobacco comments:     UNKNOWN   Vaping Use    Vaping Use: Never used   Substance and Sexual Activity    Alcohol use: Yes     Comment: monthly    Drug use: Never    Sexual activity: Defer       Medications:     Current Outpatient Medications:     clonazePAM (KlonoPIN) 1 MG tablet, Take 1 tablet by mouth 2 (Two) Times a Day As Needed for Anxiety. Take two tablets by oral route daily at bedtime, Disp: 60 tablet, Rfl: 2    atorvastatin (LIPITOR) 10 MG tablet, Take 1 tablet by mouth Daily., Disp: 90 tablet, Rfl: 1    B Complex Vitamins (Vitamin B Complex) tablet, Take  by mouth Daily. Take one tablet by oral route daily, Disp: , Rfl:     Biotin 10 MG capsule, Take  by mouth. Take one tablet by oral route daily, Disp: , Rfl:     cholecalciferol (VITAMIN D3) 10 MCG (400 UNIT) tablet, Take 1 tablet by mouth Daily., Disp: , Rfl:     coenzyme Q10 100 MG capsule, Take 2 capsules by mouth Daily., Disp: , Rfl:     diphenhydrAMINE-acetaminophen (TYLENOL PM)  MG tablet per tablet, Take 1 tablet by mouth At Night As Needed for Sleep., Disp: , Rfl:     guaiFENesin-codeine (GUAIFENESIN AC) 100-10 MG/5ML liquid, Take 5 mL by mouth 3 (Three) Times a Day As Needed for Cough., Disp: 120 mL, Rfl: 0    Melatonin 200 MCG tablet, Take  by mouth., Disp: , Rfl:     metoprolol tartrate (LOPRESSOR) 50 MG tablet, Take 1 tablet by mouth 2 (Two) Times a Day., Disp: 180 tablet, Rfl: 3    MILK THISTLE PO, Take  by mouth., Disp: , Rfl:     naproxen (NAPROSYN) 375 MG tablet, TAKE ONE TABLET BY MOUTH TWICE A DAY WITH FOOD, Disp: 180 tablet, Rfl: 0    omeprazole (priLOSEC) 40 MG capsule, TAKE ONE CAPSULE BY MOUTH EVERY MORNING BEFORE BREAKFAST, Disp: 90 capsule, Rfl: 1    predniSONE (DELTASONE) 20 MG tablet, 3 po qd x3 d then 2 po qd x3d then 1 po qd x3d, Disp: 18 tablet, Rfl: 0    Semaglutide-Weight  "Management (Wegovy) 0.25 MG/0.5ML solution auto-injector, Inject 0.25 mg under the skin into the appropriate area as directed 1 (One) Time Per Week., Disp: 2 mL, Rfl: 1    Tofacitinib Citrate ER (Xeljanz XR) 11 MG tablet sustained-release 24 hour,  mg Tab, Oral, Daily, 0 Refill(s), Disp: , Rfl:     valsartan (DIOVAN) 160 MG tablet, Take 1 tablet by mouth Daily., Disp: 90 tablet, Rfl: 3    Current Facility-Administered Medications:     triamcinolone acetonide (KENALOG-40) injection 40 mg, 40 mg, Intramuscular, Once, Kalen Leslie MD    Allergies:   Allergies   Allergen Reactions    Niacin Nausea And Vomiting       Objective     Physical Exam:  Vital Signs:   Vitals:    10/25/23 1113   BP: (!) 170/102   Pulse: 58   SpO2: 96%   Weight: 90.5 kg (199 lb 8 oz)   Height: 157.5 cm (62\")     Body mass index is 36.49 kg/m².     Physical Exam  Vitals and nursing note reviewed.   Constitutional:       General: She is not in acute distress.     Appearance: Normal appearance. She is not ill-appearing.   HENT:      Head: Normocephalic and atraumatic.      Right Ear: Tympanic membrane and ear canal normal.      Left Ear: Tympanic membrane and ear canal normal.      Nose: Nose normal.   Cardiovascular:      Rate and Rhythm: Normal rate and regular rhythm.      Heart sounds: Normal heart sounds.   Pulmonary:      Effort: Pulmonary effort is normal.      Breath sounds: Normal breath sounds.   Neurological:      Mental Status: She is alert and oriented to person, place, and time. Mental status is at baseline.   Psychiatric:         Mood and Affect: Mood normal.         Procedures      Assessment / Plan      Assessment/Plan:   Diagnoses and all orders for this visit:    1. Bronchitis (Primary)  -     predniSONE (DELTASONE) 20 MG tablet; 3 po qd x3 d then 2 po qd x3d then 1 po qd x3d  Dispense: 18 tablet; Refill: 0    2. Anxiety  -     clonazePAM (KlonoPIN) 1 MG tablet; Take 1 tablet by mouth 2 (Two) Times a Day As Needed for " Anxiety. Take two tablets by oral route daily at bedtime  Dispense: 60 tablet; Refill: 2    3. Acute bilateral back pain, unspecified back location  -     predniSONE (DELTASONE) 20 MG tablet; 3 po qd x3 d then 2 po qd x3d then 1 po qd x3d  Dispense: 18 tablet; Refill: 0  -     triamcinolone acetonide (KENALOG-40) injection 40 mg         Give injection of steroid along with steroid taper for her back pain and the bronchial issues.  Refilled medications for anxiety.    Follow Up:   No follow-ups on file.    Kalen Leslie  [facility] [place] [facility] [place]

## 2023-12-11 ENCOUNTER — OFFICE VISIT (OUTPATIENT)
Dept: CARDIOLOGY | Facility: CLINIC | Age: 68
End: 2023-12-11
Payer: MEDICARE

## 2023-12-11 VITALS
SYSTOLIC BLOOD PRESSURE: 112 MMHG | HEART RATE: 74 BPM | OXYGEN SATURATION: 99 % | WEIGHT: 194 LBS | BODY MASS INDEX: 30.45 KG/M2 | DIASTOLIC BLOOD PRESSURE: 62 MMHG | RESPIRATION RATE: 18 BRPM | HEIGHT: 67 IN | TEMPERATURE: 98 F

## 2023-12-11 DIAGNOSIS — Z91.89 CHRONIC CHEST PAIN WITH LOW TO MODERATE RISK FOR CAD: ICD-10-CM

## 2023-12-11 DIAGNOSIS — E78.5 HYPERLIPIDEMIA LDL GOAL <100: ICD-10-CM

## 2023-12-11 DIAGNOSIS — I10 PRIMARY HYPERTENSION: Primary | ICD-10-CM

## 2023-12-11 DIAGNOSIS — R07.9 CHRONIC CHEST PAIN WITH LOW TO MODERATE RISK FOR CAD: ICD-10-CM

## 2023-12-11 DIAGNOSIS — G89.29 CHRONIC CHEST PAIN WITH LOW TO MODERATE RISK FOR CAD: ICD-10-CM

## 2023-12-11 DIAGNOSIS — I47.29 VENTRICULAR TACHYCARDIA (PAROXYSMAL): ICD-10-CM

## 2023-12-11 PROCEDURE — 3074F SYST BP LT 130 MM HG: CPT | Performed by: INTERNAL MEDICINE

## 2023-12-11 PROCEDURE — 1159F MED LIST DOCD IN RCRD: CPT | Performed by: INTERNAL MEDICINE

## 2023-12-11 PROCEDURE — 3078F DIAST BP <80 MM HG: CPT | Performed by: INTERNAL MEDICINE

## 2023-12-11 PROCEDURE — 1160F RVW MEDS BY RX/DR IN RCRD: CPT | Performed by: INTERNAL MEDICINE

## 2023-12-11 PROCEDURE — 99214 OFFICE O/P EST MOD 30 MIN: CPT | Performed by: INTERNAL MEDICINE

## 2023-12-11 RX ORDER — VALSARTAN 160 MG/1
160 TABLET ORAL DAILY
Qty: 90 TABLET | Refills: 3 | Status: SHIPPED | OUTPATIENT
Start: 2023-12-11

## 2023-12-11 RX ORDER — METOPROLOL TARTRATE 50 MG/1
50 TABLET, FILM COATED ORAL 2 TIMES DAILY
Qty: 180 TABLET | Refills: 3 | Status: SHIPPED | OUTPATIENT
Start: 2023-12-11

## 2023-12-11 RX ORDER — ATORVASTATIN CALCIUM 10 MG/1
10 TABLET, FILM COATED ORAL DAILY
Qty: 90 TABLET | Refills: 3 | Status: SHIPPED | OUTPATIENT
Start: 2023-12-11

## 2023-12-11 NOTE — PROGRESS NOTES
[facility] [place]  [facility]  [address] [place] [address]  Dept: [phone]  Dept Fax: [phone]    Katie Nicolas  1955    Follow Up Office Visit Note    History of Present Illness:  Katie Nicolas is a 68 y.o. female who presents to the clinic for Follow-up History of ventricular tachycardia - She is s/p ablation, denies any complaints- only on Metoprolol 50 mg bid    The following portions of the patient's history were reviewed and updated as appropriate: allergies, current medications, past family history, past medical history, past social history, past surgical history, and problem list.    Medications:  atorvastatin  Biotin capsule  cholecalciferol  clonazePAM  coenzyme Q10  diphenhydrAMINE-acetaminophen tablet  Melatonin tablet  metoprolol tartrate  MILK THISTLE PO  naproxen  omeprazole  triamcinolone acetonide  valsartan  Vitamin B Complex tablet  Xeljanz XR tablet sustained-release 24 hour    Subjective  Allergies   Allergen Reactions    Niacin Nausea And Vomiting        Past Medical History:   Diagnosis Date    Anxiety     Benign essential hypertension     Chest pain, unspecified     Depressive disorder     Diverticulosis     noted on CT scan for Kidney stones    GERD without esophagitis     Hyperlipidemia, unspecified     Kidney stones 2005    other episodes too    Paroxysmal supraventricular tachycardia     bypass tract ablation    Rheumatoid arthritis     meds    Sleep apnea 2006    cpap    Ventricular tachycardia     The patient visits the office for an evaluationof ventricular tachcardia. Additional  information: No complaints, s/p ablation.       Past Surgical History:   Procedure Laterality Date     SECTION         Family History   Problem Relation Age of Onset    Heart disease Father     Obesity Other         Social History     Socioeconomic History    Marital status: Single   Tobacco Use    Smoking status: Never    Smokeless  "tobacco: Never    Tobacco comments:     UNKNOWN   Vaping Use    Vaping Use: Never used   Substance and Sexual Activity    Alcohol use: Yes     Comment: monthly    Drug use: Never    Sexual activity: Defer       Review of Systems   Constitutional: Negative.    HENT: Negative.     Respiratory: Negative.     Cardiovascular: Negative.    Endocrine: Negative.    Genitourinary: Negative.    Musculoskeletal: Negative.    Skin: Negative.    Allergic/Immunologic: Negative.    Neurological: Negative.    Hematological: Negative.    Psychiatric/Behavioral: Negative.       Cardiovascular Procedures    ECHO/MUGA:  STRESS TESTS:   CARDIAC CATH:   DEVICES:   HOLTER:   CT/MRI:   VASCULAR:   CARDIOTHORACIC:     Objective  Vitals:    12/11/23 1128   BP: 148/84   BP Location: Right arm   Patient Position: Lying   Cuff Size: Adult   Pulse: 74   Resp: 18   Temp: 98 °F (36.7 °C)   TempSrc: Infrared   SpO2: 99%   Weight: 88 kg (194 lb)   Height: 170.2 cm (67\")   PainSc: 0-No pain     Body mass index is 30.38 kg/m².     Physical Exam  Vitals reviewed.   Constitutional:       Appearance: Healthy appearance. Not in distress.   Neck:      Vascular: No JVR. JVD normal.   Pulmonary:      Effort: Pulmonary effort is normal.      Breath sounds: Normal breath sounds. No wheezing. No rhonchi. No rales.   Chest:      Chest wall: Not tender to palpatation.   Cardiovascular:      PMI at left midclavicular line. Normal rate. Regular rhythm. Normal S1. Normal S2.       Murmurs: There is no murmur.      No gallop.  No click. No rub.   Pulses:     Intact distal pulses.   Edema:     Peripheral edema absent.   Abdominal:      General: Bowel sounds are normal.      Palpations: Abdomen is soft.      Tenderness: There is no abdominal tenderness.   Musculoskeletal: Normal range of motion.         General: No tenderness. Skin:     General: Skin is warm and dry.   Neurological:      General: No focal deficit present.      Mental Status: Alert and oriented to " person, place and time.        Diagnostic Data  Procedures    Assessment and Plan  Diagnoses and all orders for this visit:    Primary hypertension-BP is good 110.60, on Metoprolol 50 mg bid and also Valsartan 160 mg, no complaints    Hyperlipidemia LDL goal <100- On Lipitor Ldl is good 91    Chronic chest pain with low to moderate risk for CAD    Ventricular tachycardia (paroxysmal)- No complaints, s/p ablation, on metoprolol 50 mg bid         No follow-ups on file.    Héctor Dumont MD  12/11/2023

## 2023-12-21 RX ORDER — NAPROXEN 375 MG/1
TABLET ORAL
Qty: 60 TABLET | Refills: 0 | Status: SHIPPED | OUTPATIENT
Start: 2023-12-21

## 2024-01-04 DIAGNOSIS — J40 BRONCHITIS: ICD-10-CM

## 2024-01-04 DIAGNOSIS — M54.9 ACUTE BILATERAL BACK PAIN, UNSPECIFIED BACK LOCATION: ICD-10-CM

## 2024-01-05 RX ORDER — PREDNISONE 20 MG/1
TABLET ORAL
Qty: 18 TABLET | Refills: 0 | OUTPATIENT
Start: 2024-01-05

## 2024-01-22 DIAGNOSIS — F41.9 ANXIETY: ICD-10-CM

## 2024-01-22 NOTE — TELEPHONE ENCOUNTER
Incoming Refill Request      Medication requested (name and dose):   ClonzamePAM 1 MG     Pharmacy where request should be sent: [facility] [place] [address]     Additional details provided by patient: Patients appointment was orginally for 1/22, and was rescheduled to 2/15     Best call back number: [phone]    Does the patient have less than a 3 day supply:  [x] Yes  [] No    Romeo Hoffman Rep  01/22/24, 08:19 EST

## 2024-01-23 RX ORDER — CLONAZEPAM 1 MG/1
1 TABLET ORAL 2 TIMES DAILY PRN
Qty: 60 TABLET | Refills: 2 | Status: SHIPPED | OUTPATIENT
Start: 2024-01-23

## 2024-02-15 ENCOUNTER — OFFICE VISIT (OUTPATIENT)
Dept: FAMILY MEDICINE CLINIC | Facility: CLINIC | Age: 69
End: 2024-02-15
Payer: MEDICARE

## 2024-02-15 VITALS
OXYGEN SATURATION: 97 % | SYSTOLIC BLOOD PRESSURE: 130 MMHG | HEART RATE: 78 BPM | WEIGHT: 197 LBS | BODY MASS INDEX: 36.25 KG/M2 | HEIGHT: 62 IN | DIASTOLIC BLOOD PRESSURE: 80 MMHG

## 2024-02-15 DIAGNOSIS — Z79.899 ENCOUNTER FOR LONG-TERM (CURRENT) USE OF OTHER MEDICATIONS: ICD-10-CM

## 2024-02-15 DIAGNOSIS — F41.9 ANXIETY: Primary | ICD-10-CM

## 2024-02-15 DIAGNOSIS — R21 RASH AND NONSPECIFIC SKIN ERUPTION: ICD-10-CM

## 2024-02-15 DIAGNOSIS — M54.9 ACUTE BILATERAL BACK PAIN, UNSPECIFIED BACK LOCATION: ICD-10-CM

## 2024-02-15 LAB
POC AMPHETAMINES: NEGATIVE
POC BARBITURATES: NEGATIVE
POC BENZODIAZEPHINES: NEGATIVE
POC COCAINE: NEGATIVE
POC METHADONE: NEGATIVE
POC METHAMPHETAMINE SCREEN URINE: NEGATIVE
POC OPIATES: NEGATIVE
POC OXYCODONE: NEGATIVE
POC PHENCYCLIDINE: NEGATIVE
POC PROPOXYPHENE: NEGATIVE
POC THC: NEGATIVE
POC TRICYCLIC ANTIDEPRESSANTS: NEGATIVE

## 2024-02-15 PROCEDURE — 3075F SYST BP GE 130 - 139MM HG: CPT | Performed by: FAMILY MEDICINE

## 2024-02-15 PROCEDURE — 3079F DIAST BP 80-89 MM HG: CPT | Performed by: FAMILY MEDICINE

## 2024-02-15 PROCEDURE — 99214 OFFICE O/P EST MOD 30 MIN: CPT | Performed by: FAMILY MEDICINE

## 2024-02-15 PROCEDURE — 80305 DRUG TEST PRSMV DIR OPT OBS: CPT | Performed by: FAMILY MEDICINE

## 2024-02-15 RX ORDER — ATORVASTATIN CALCIUM 20 MG/1
20 TABLET, FILM COATED ORAL DAILY
Qty: 90 TABLET | Refills: 1 | Status: SHIPPED | OUTPATIENT
Start: 2024-02-15

## 2024-04-24 RX ORDER — OMEPRAZOLE 40 MG/1
40 CAPSULE, DELAYED RELEASE ORAL
Qty: 90 CAPSULE | Refills: 1 | Status: SHIPPED | OUTPATIENT
Start: 2024-04-24

## 2024-05-22 ENCOUNTER — OFFICE VISIT (OUTPATIENT)
Dept: FAMILY MEDICINE CLINIC | Facility: CLINIC | Age: 69
End: 2024-05-22
Payer: MEDICARE

## 2024-05-22 VITALS
DIASTOLIC BLOOD PRESSURE: 78 MMHG | OXYGEN SATURATION: 97 % | WEIGHT: 200.5 LBS | HEIGHT: 62 IN | SYSTOLIC BLOOD PRESSURE: 150 MMHG | BODY MASS INDEX: 36.9 KG/M2 | HEART RATE: 60 BPM

## 2024-05-22 DIAGNOSIS — Z00.00 MEDICARE ANNUAL WELLNESS VISIT, SUBSEQUENT: Primary | ICD-10-CM

## 2024-05-22 DIAGNOSIS — F41.9 ANXIETY: ICD-10-CM

## 2024-05-22 DIAGNOSIS — Z12.31 ENCOUNTER FOR SCREENING MAMMOGRAM FOR MALIGNANT NEOPLASM OF BREAST: ICD-10-CM

## 2024-05-22 DIAGNOSIS — I10 PRIMARY HYPERTENSION: ICD-10-CM

## 2024-05-22 RX ORDER — CLONAZEPAM 1 MG/1
1 TABLET ORAL 2 TIMES DAILY PRN
Qty: 60 TABLET | Refills: 2 | Status: SHIPPED | OUTPATIENT
Start: 2024-05-22

## 2024-05-23 NOTE — PROGRESS NOTES
The ABCs of the Annual Wellness Visit  Subsequent Medicare Wellness Visit    Subjective      Katie Nicolas is a 68 y.o. female who presents for a Subsequent Medicare Wellness Visit.    The following portions of the patient's history were reviewed and   updated as appropriate: allergies, current medications, past family history, past medical history, past social history, past surgical history, and problem list.    Compared to one year ago, the patient feels her physical   health is the same.    Compared to one year ago, the patient feels her mental   health is the same.    Recent Hospitalizations:  She was not admitted to the hospital during the last year.       Current Medical Providers:  Patient Care Team:  Kalen Leslie MD as PCP - General (Family Medicine)  Héctor Dumont MD as Consulting Physician (Cardiology)    Outpatient Medications Prior to Visit   Medication Sig Dispense Refill    atorvastatin (LIPITOR) 20 MG tablet Take 1 tablet by mouth Daily. 90 tablet 1    B Complex Vitamins (Vitamin B Complex) tablet Take  by mouth Daily. Take one tablet by oral route daily      Biotin 10 MG capsule Take  by mouth. Take one tablet by oral route daily      cholecalciferol (VITAMIN D3) 10 MCG (400 UNIT) tablet Take 1 tablet by mouth Daily.      coenzyme Q10 100 MG capsule Take 2 capsules by mouth Daily.      diphenhydrAMINE-acetaminophen (TYLENOL PM)  MG tablet per tablet Take 1 tablet by mouth At Night As Needed for Sleep.      Melatonin 200 MCG tablet Take  by mouth.      metoprolol tartrate (LOPRESSOR) 50 MG tablet Take 1 tablet by mouth 2 (Two) Times a Day. 180 tablet 3    MILK THISTLE PO Take  by mouth.      omeprazole (priLOSEC) 40 MG capsule TAKE ONE CAPSULE BY MOUTH EVERY MORNING BEFORE BREAKFAST 90 capsule 1    Tofacitinib Citrate ER (Xeljanz XR) 11 MG tablet sustained-release 24 hour   mg Tab, Oral, Daily, 0 Refill(s)      valsartan (DIOVAN) 160 MG tablet Take 1 tablet by mouth Daily. 90  "tablet 3    clonazePAM (KlonoPIN) 1 MG tablet Take 1 tablet by mouth 2 (Two) Times a Day As Needed for Anxiety. Take two tablets by oral route daily at bedtime 60 tablet 2    diclofenac (VOLTAREN) 50 MG EC tablet TAKE 1 TABLET BY MOUTH TWICE A DAY AS NEEDED FOR BACK PAIN 60 tablet 0    naproxen (NAPROSYN) 375 MG tablet TAKE ONE TABLET BY MOUTH TWICE A DAY WITH FOOD 60 tablet 0     Facility-Administered Medications Prior to Visit   Medication Dose Route Frequency Provider Last Rate Last Admin    triamcinolone acetonide (KENALOG-40) injection 40 mg  40 mg Intramuscular Once Kalen Leslie MD           No opioid medication identified on active medication list. I have reviewed chart for other potential  high risk medication/s and harmful drug interactions in the elderly.        Aspirin is not on active medication list.  Aspirin use is not indicated based on review of current medical condition/s. Risk of harm outweighs potential benefits.  .    Patient Active Problem List   Diagnosis    Ventricular tachycardia (paroxysmal)    Hyperlipidemia LDL goal <100    Hypertension    Chronic chest pain with low to moderate risk for CAD    Anxiety    Rheumatoid arthritis of wrist    Shingles    Bronchitis     Advance Care Planning   Advance Care Planning     Advance Directive is not on file.  ACP discussion was held with the patient during this visit. Patient does not have an advance directive, information provided.     Objective    Vitals:    05/22/24 1522   BP: 150/78   Pulse: 60   SpO2: 97%   Weight: 90.9 kg (200 lb 8 oz)   Height: 157.5 cm (62\")     Estimated body mass index is 36.67 kg/m² as calculated from the following:    Height as of this encounter: 157.5 cm (62\").    Weight as of this encounter: 90.9 kg (200 lb 8 oz).    Class 2 Severe Obesity (BMI >=35 and <=39.9). Obesity-related health conditions include the following: none. Obesity is unchanged. BMI is is above average; BMI management plan is completed. We " discussed portion control and increasing exercise.      Does the patient have evidence of cognitive impairment?   No            HEALTH RISK ASSESSMENT    Smoking Status:  Social History     Tobacco Use   Smoking Status Never   Smokeless Tobacco Never   Tobacco Comments    UNKNOWN     Alcohol Consumption:  Social History     Substance and Sexual Activity   Alcohol Use Yes    Comment: monthly     Fall Risk Screen:    GINNA Fall Risk Assessment was completed, and patient is at LOW risk for falls.Assessment completed on:2024    Depression Screenin/22/2024     3:24 PM   PHQ-2/PHQ-9 Depression Screening   Little Interest or Pleasure in Doing Things 0-->not at all   Feeling Down, Depressed or Hopeless 0-->not at all   PHQ-9: Brief Depression Severity Measure Score 0       Health Habits and Functional and Cognitive Screenin/22/2024     3:24 PM   Functional & Cognitive Status   Do you have difficulty preparing food and eating? No   Do you have difficulty bathing yourself, getting dressed or grooming yourself? No   Do you have difficulty using the toilet? No   Do you have difficulty moving around from place to place? No   Do you have trouble with steps or getting out of a bed or a chair? No   Current Diet Well Balanced Diet   Dental Exam Up to date   Eye Exam Up to date   Exercise (times per week) 4 times per week   Current Exercises Include Yard Work;Gardening   Do you need help using the phone?  No   Are you deaf or do you have serious difficulty hearing?  No   Do you need help to go to places out of walking distance? No   Do you need help shopping? No   Do you need help preparing meals?  No   Do you need help with housework?  No   Do you need help with laundry? No   Do you need help taking your medications? No   Do you need help managing money? No   Do you ever drive or ride in a car without wearing a seat belt? No   Have you felt unusual stress, anger or loneliness in the last month? No   Who do  you live with? Alone   If you need help, do you have trouble finding someone available to you? No   Have you been bothered in the last four weeks by sexual problems? No   Do you have difficulty concentrating, remembering or making decisions? No       Age-appropriate Screening Schedule:  Refer to the list below for future screening recommendations based on patient's age, sex and/or medical conditions. Orders for these recommended tests are listed in the plan section. The patient has been provided with a written plan.    Health Maintenance   Topic Date Due    ZOSTER VACCINE (1 of 2) Never done    RSV Vaccine - Adults (1 - 1-dose 60+ series) Never done    ANNUAL WELLNESS VISIT  08/01/2023    BMI FOLLOWUP  08/01/2023    MAMMOGRAM  11/09/2023    COVID-19 Vaccine (6 - 2023-24 season) 04/18/2024    INFLUENZA VACCINE  08/01/2024    DXA SCAN  08/30/2024    LIPID PANEL  12/18/2024    COLORECTAL CANCER SCREENING  09/26/2026    TDAP/TD VACCINES (2 - Td or Tdap) 06/09/2027    HEPATITIS C SCREENING  Completed    Pneumococcal Vaccine 65+  Completed                  CMS Preventative Services Quick Reference  Risk Factors Identified During Encounter:    None Identified    The above risks/problems have been discussed with the patient.  Pertinent information has been shared with the patient in the After Visit Summary.    Diagnoses and all orders for this visit:    1. Medicare annual wellness visit, subsequent (Primary)    2. Primary hypertension  -     CBC & Differential; Future  -     Comprehensive Metabolic Panel; Future  -     Lipid Panel; Future  -     TSH; Future    3. Anxiety  -     clonazePAM (KlonoPIN) 1 MG tablet; Take 1 tablet by mouth 2 (Two) Times a Day As Needed for Anxiety. Take two tablets by oral route daily at bedtime  Dispense: 60 tablet; Refill: 2    4. Encounter for screening mammogram for malignant neoplasm of breast  -     Mammo Screening Digital Tomosynthesis Bilateral With CAD; Future    Other orders  -      diclofenac (VOLTAREN) 50 MG EC tablet; Take 1 tablet by mouth 2 (Two) Times a Day.  Dispense: 60 tablet; Refill: 2    Updated annual wellness visit checklist.  Immunizations discussed.  Screening up-to-date.  Recommend yearly dental and eye exams. Also discussed monitoring of blood pressure and lipids. We addressed patient self-assessment of health status, frailty, and physical functioning. We reviewed psychosocial risks, behavioral risks, instrumental activities of daily living, and patient health risk assessment. Patient was given a personalized prevention plan.     Follow Up:   Next Medicare Wellness visit to be scheduled in 1 year.      An After Visit Summary and PPPS were made available to the patient.

## 2024-06-11 ENCOUNTER — OFFICE VISIT (OUTPATIENT)
Dept: CARDIOLOGY | Facility: CLINIC | Age: 69
End: 2024-06-11
Payer: MEDICARE

## 2024-06-11 VITALS
OXYGEN SATURATION: 96 % | HEART RATE: 74 BPM | RESPIRATION RATE: 18 BRPM | WEIGHT: 201 LBS | SYSTOLIC BLOOD PRESSURE: 130 MMHG | HEIGHT: 62 IN | BODY MASS INDEX: 36.99 KG/M2 | DIASTOLIC BLOOD PRESSURE: 74 MMHG

## 2024-06-11 DIAGNOSIS — E78.5 HYPERLIPIDEMIA LDL GOAL <100: ICD-10-CM

## 2024-06-11 DIAGNOSIS — I10 PRIMARY HYPERTENSION: Primary | ICD-10-CM

## 2024-06-11 DIAGNOSIS — I47.29 VENTRICULAR TACHYCARDIA (PAROXYSMAL): ICD-10-CM

## 2024-06-11 PROCEDURE — 93000 ELECTROCARDIOGRAM COMPLETE: CPT | Performed by: INTERNAL MEDICINE

## 2024-06-11 PROCEDURE — 99214 OFFICE O/P EST MOD 30 MIN: CPT | Performed by: INTERNAL MEDICINE

## 2024-06-11 PROCEDURE — 1159F MED LIST DOCD IN RCRD: CPT | Performed by: INTERNAL MEDICINE

## 2024-06-11 PROCEDURE — 3075F SYST BP GE 130 - 139MM HG: CPT | Performed by: INTERNAL MEDICINE

## 2024-06-11 PROCEDURE — 1160F RVW MEDS BY RX/DR IN RCRD: CPT | Performed by: INTERNAL MEDICINE

## 2024-06-11 PROCEDURE — 3078F DIAST BP <80 MM HG: CPT | Performed by: INTERNAL MEDICINE

## 2024-06-11 RX ORDER — KETOCONAZOLE 20 MG/G
CREAM TOPICAL
COMMUNITY
Start: 2024-05-16

## 2024-06-11 RX ORDER — CYCLOSPORINE 0.5 MG/ML
1 EMULSION OPHTHALMIC EVERY 12 HOURS
COMMUNITY
Start: 2024-04-10

## 2024-06-11 NOTE — PROGRESS NOTES
[facility]  [facility]  [address] [place] [address]  Dept: [phone]  Dept Fax: [phone]    Katie Nicolas  1955    Follow Up Office Visit Note    History of Present Illness:  Katie Nicolas is a 68 y.o. female who presents to the clinic for Follow-up.Ventricular tachycardia - she is asymptomatic, s./p ablation x 2 last one in . Doing good since them only on Metoprolol 50 mg bid    The following portions of the patient's history were reviewed and updated as appropriate: allergies, current medications, past family history, past medical history, past social history, past surgical history, and problem list.    Medications:  atorvastatin  Biotin capsule  cholecalciferol  clonazePAM  coenzyme Q10  diclofenac  diphenhydrAMINE-acetaminophen tablet  ketoconazole  Melatonin tablet  metoprolol tartrate  MILK THISTLE PO  omeprazole  Restasis emulsion  triamcinolone acetonide  valsartan  Vitamin B Complex tablet  Xeljanz XR tablet sustained-release 24 hour    Subjective  Allergies   Allergen Reactions    Niacin Nausea And Vomiting        Past Medical History:   Diagnosis Date    Anxiety     Benign essential hypertension     Chest pain, unspecified     Depressive disorder     Diverticulosis     noted on CT scan for Kidney stones    GERD without esophagitis     Hyperlipidemia, unspecified     Kidney stones 2005    other episodes too    Paroxysmal supraventricular tachycardia     bypass tract ablation    Rheumatoid arthritis     meds    Sleep apnea 2006    cpap    Ventricular tachycardia     The patient visits the office for an evaluationof ventricular tachcardia. Additional  information: No complaints, s/p ablation.       Past Surgical History:   Procedure Laterality Date     SECTION         Family History   Problem Relation Age of Onset    Heart disease Father     Obesity Other         Social History     Socioeconomic History    Marital status: Single  "  Tobacco Use    Smoking status: Never    Smokeless tobacco: Never    Tobacco comments:     UNKNOWN   Vaping Use    Vaping status: Never Used   Substance and Sexual Activity    Alcohol use: Yes     Comment: monthly    Drug use: Never    Sexual activity: Defer       Review of Systems   Constitutional: Negative.    HENT: Negative.     Respiratory: Negative.     Cardiovascular: Negative.    Endocrine: Negative.    Genitourinary: Negative.    Musculoskeletal: Negative.    Skin: Negative.    Allergic/Immunologic: Negative.    Neurological: Negative.    Hematological: Negative.    Psychiatric/Behavioral: Negative.         Cardiovascular Procedures    ECHO/MUGA:  STRESS TESTS:   CARDIAC CATH:   DEVICES:   HOLTER:   CT/MRI:   VASCULAR:   CARDIOTHORACIC:     Objective  Vitals:    06/11/24 1059   BP: 130/74   BP Location: Right arm   Patient Position: Lying   Cuff Size: Adult   Pulse: 74   Resp: 18   SpO2: 96%   Weight: 91.2 kg (201 lb)   Height: 157.5 cm (62\")   PainSc: 0-No pain     Body mass index is 36.76 kg/m².     Physical Exam  Vitals reviewed.   Constitutional:       Appearance: Healthy appearance. Not in distress.   Neck:      Vascular: No JVR. JVD normal.   Pulmonary:      Effort: Pulmonary effort is normal.      Breath sounds: Normal breath sounds. No wheezing. No rhonchi. No rales.   Chest:      Chest wall: Not tender to palpatation.   Cardiovascular:      PMI at left midclavicular line. Normal rate. Regular rhythm. Normal S1. Normal S2.       Murmurs: There is no murmur.      No gallop.  No click. No rub.   Pulses:     Intact distal pulses.   Edema:     Peripheral edema absent.   Abdominal:      General: Bowel sounds are normal.      Palpations: Abdomen is soft.      Tenderness: There is no abdominal tenderness.   Musculoskeletal: Normal range of motion.         General: No tenderness. Skin:     General: Skin is warm and dry.   Neurological:      General: No focal deficit present.      Mental Status: Alert and " oriented to person, place and time.        Diagnostic Data    ECG 12 Lead    Date/Time: 6/11/2024 11:20 AM  Performed by: Héctor Dumont MD    Authorized by: Héctor Dumont MD  Comparison: compared with previous ECG from 6/12/2023  Similar to previous ECG  Rhythm: sinus rhythm  Rate: normal  BPM: 61  Conduction: right bundle branch block  QRS axis: normal    Clinical impression: abnormal EKG        Assessment and Plan  Diagnoses and all orders for this visit:    Primary hypertension- BP is 120.80- BP is good 110.70 on Metoprolol 50 mg bid,    Hyperlipidemia LDL goal <100- On Lipitor 20 mg, has had a Lipid in 202 good    Ventricular tachycardia (paroxysmal)- No complaints on Metoprolol.    Other orders  -     ketoconazole (NIZORAL) 2 % cream  -     Restasis 0.05 % ophthalmic emulsion; Apply 1 drop to eye(s) as directed by provider Every 12 (Twelve) Hours.         Return in about 1 year (around 6/11/2025) for Recheck with Dr. Dumont.    Héctor Dumont MD  06/11/2024

## 2024-08-12 RX ORDER — ATORVASTATIN CALCIUM 20 MG/1
20 TABLET, FILM COATED ORAL DAILY
Qty: 90 TABLET | Refills: 0 | Status: SHIPPED | OUTPATIENT
Start: 2024-08-12

## 2024-08-21 ENCOUNTER — OFFICE VISIT (OUTPATIENT)
Dept: FAMILY MEDICINE CLINIC | Facility: CLINIC | Age: 69
End: 2024-08-21
Payer: MEDICARE

## 2024-08-21 VITALS
RESPIRATION RATE: 17 BRPM | HEART RATE: 74 BPM | OXYGEN SATURATION: 98 % | SYSTOLIC BLOOD PRESSURE: 122 MMHG | WEIGHT: 193.56 LBS | HEIGHT: 62 IN | BODY MASS INDEX: 35.62 KG/M2 | DIASTOLIC BLOOD PRESSURE: 84 MMHG

## 2024-08-21 DIAGNOSIS — F41.9 ANXIETY: Primary | ICD-10-CM

## 2024-08-21 DIAGNOSIS — M25.561 ACUTE PAIN OF RIGHT KNEE: ICD-10-CM

## 2024-08-21 PROCEDURE — 3074F SYST BP LT 130 MM HG: CPT | Performed by: FAMILY MEDICINE

## 2024-08-21 PROCEDURE — 99214 OFFICE O/P EST MOD 30 MIN: CPT | Performed by: FAMILY MEDICINE

## 2024-08-21 PROCEDURE — 1125F AMNT PAIN NOTED PAIN PRSNT: CPT | Performed by: FAMILY MEDICINE

## 2024-08-21 PROCEDURE — 3079F DIAST BP 80-89 MM HG: CPT | Performed by: FAMILY MEDICINE

## 2024-08-21 RX ORDER — CLONAZEPAM 1 MG/1
1 TABLET ORAL 2 TIMES DAILY PRN
Qty: 60 TABLET | Refills: 2 | Status: SHIPPED | OUTPATIENT
Start: 2024-08-21

## 2024-08-21 NOTE — PROGRESS NOTES
Follow Up Office Visit      Date of Visit:  2024   Patient Name: Katie Nicolas  : 1955   MRN: [id number]     Chief Complaint:    Chief Complaint   Patient presents with    Med Refill     Refill clonazepam.     Knee Pain     Fell on concrete on Saturday.        History of Present Illness: Katie Nicolas is a 69 y.o. female who is here today for follow up.    History of Present Illness  The patient presents for a medication refill.    She is seeking a refill of her clonazepam prescription. Over the past 3 weeks, she has been taking more clonazepam than usual, but never exceeding 3 tablets per day.    She reports a recent fall on concrete, resulting in knee injuries. She applied ice to her knees immediately after the incident and continued to do so throughout the evening. She also slept with an ice pack on her knees and used Neosporin for pain relief. She is currently taking diclofenac twice daily for inflammation, which has significantly alleviated her back pain. She is unable to take naproxen.    Her weight today is 193 pounds, down from 201 pounds previously. She attributes this weight loss to a lack of appetite and reduced food intake.      Subjective      Review of Systems:   Review of Systems   Constitutional:  Negative for fatigue and fever.   HENT:  Negative for congestion and ear pain.    Respiratory:  Negative for apnea, cough, chest tightness and shortness of breath.    Cardiovascular:  Negative for chest pain.   Gastrointestinal:  Negative for abdominal pain, constipation, diarrhea and nausea.   Musculoskeletal:  Negative for arthralgias.   Psychiatric/Behavioral:  Negative for depressed mood and stress.        Past Medical History:   Past Medical History:   Diagnosis Date    Anxiety     Benign essential hypertension     Chest pain, unspecified     Depressive disorder     Diverticulosis     noted on CT scan for Kidney stones    GERD without esophagitis     Hyperlipidemia, unspecified      Kidney stones 2005    other episodes too    Paroxysmal supraventricular tachycardia     bypass tract ablation    Rheumatoid arthritis     meds    Sleep apnea 2006    cpap    Ventricular tachycardia     The patient visits the office for an evaluationof ventricular tachcardia. Additional  information: No complaints, s/p ablation.       Past Surgical History:   Past Surgical History:   Procedure Laterality Date     SECTION         Family History:   Family History   Problem Relation Age of Onset    Heart disease Father     Obesity Other        Social History:   Social History     Socioeconomic History    Marital status: Single   Tobacco Use    Smoking status: Never    Smokeless tobacco: Never   Vaping Use    Vaping status: Never Used   Substance and Sexual Activity    Alcohol use: Yes     Comment: monthly    Drug use: Never    Sexual activity: Defer       Medications:     Current Outpatient Medications:     atorvastatin (LIPITOR) 20 MG tablet, TAKE 1 TABLET BY MOUTH DAILY, Disp: 90 tablet, Rfl: 0    B Complex Vitamins (Vitamin B Complex) tablet, Take  by mouth Daily. Take one tablet by oral route daily, Disp: , Rfl:     cholecalciferol (VITAMIN D3) 10 MCG (400 UNIT) tablet, Take 1 tablet by mouth Daily., Disp: , Rfl:     clonazePAM (KlonoPIN) 1 MG tablet, Take 1 tablet by mouth 2 (Two) Times a Day As Needed for Anxiety. Take two tablets by oral route daily at bedtime, Disp: 60 tablet, Rfl: 2    coenzyme Q10 100 MG capsule, Take 2 capsules by mouth Daily., Disp: , Rfl:     diclofenac (VOLTAREN) 50 MG EC tablet, Take 1 tablet by mouth 2 (Two) Times a Day., Disp: 60 tablet, Rfl: 2    diphenhydrAMINE-acetaminophen (TYLENOL PM)  MG tablet per tablet, Take 1 tablet by mouth At Night As Needed for Sleep., Disp: , Rfl:     Magnesium 100 MG tablet, Take 100 mg by mouth Daily., Disp: , Rfl:     Melatonin 200 MCG tablet, Take  by mouth., Disp: , Rfl:     metoprolol tartrate (LOPRESSOR) 50 MG tablet, Take 1 tablet  "by mouth 2 (Two) Times a Day., Disp: 180 tablet, Rfl: 3    MILK THISTLE PO, Take  by mouth., Disp: , Rfl:     omeprazole (priLOSEC) 40 MG capsule, TAKE ONE CAPSULE BY MOUTH EVERY MORNING BEFORE BREAKFAST, Disp: 90 capsule, Rfl: 1    Restasis 0.05 % ophthalmic emulsion, Apply 1 drop to eye(s) as directed by provider Every 12 (Twelve) Hours., Disp: , Rfl:     Tofacitinib Citrate ER (Xeljanz XR) 11 MG tablet sustained-release 24 hour,  mg Tab, Oral, Daily, 0 Refill(s), Disp: , Rfl:     valsartan (DIOVAN) 160 MG tablet, Take 1 tablet by mouth Daily., Disp: 90 tablet, Rfl: 3  No current facility-administered medications for this visit.    Allergies:   Allergies   Allergen Reactions    Niacin Nausea And Vomiting       Objective     Physical Exam:  Vital Signs:   Vitals:    08/21/24 1050   BP: 122/84   BP Location: Left arm   Patient Position: Sitting   Cuff Size: Adult   Pulse: 74   Resp: 17   SpO2: 98%   Weight: 87.8 kg (193 lb 9 oz)   Height: 157.5 cm (62\")   PainSc:   4   PainLoc: Knee     Body mass index is 35.4 kg/m².     Physical Exam  Vitals and nursing note reviewed.   Constitutional:       General: She is not in acute distress.     Appearance: Normal appearance. She is not ill-appearing.   HENT:      Head: Normocephalic and atraumatic.      Right Ear: Tympanic membrane and ear canal normal.      Left Ear: Tympanic membrane and ear canal normal.      Nose: Nose normal.   Cardiovascular:      Rate and Rhythm: Normal rate and regular rhythm.      Heart sounds: Normal heart sounds.   Pulmonary:      Effort: Pulmonary effort is normal.      Breath sounds: Normal breath sounds.   Neurological:      Mental Status: She is alert and oriented to person, place, and time. Mental status is at baseline.   Psychiatric:         Mood and Affect: Mood normal.       Physical Exam      Procedures    Results    Assessment / Plan      Assessment/Plan:   There are no diagnoses linked to this encounter.   Assessment & Plan  1. Grief and " Bereavement.  She is experiencing grief following the recent death of her nephew from leukemia. She is advised to engage in physical activities such as walking to help clear her mind and improve her overall well-being.    2. Knee Injury.  She fell on concrete and injured her knees. She applied ice and Neosporin initially. She is advised to discontinue Neosporin after 2 to 3 days and switch to plain Vaseline to prevent scarring and maintain moisture in the skin.    3. Medication Management.  A prescription for clonazepam was renewed. She reported taking more clonazepam than usual due to recent stress but did not exceed three tablets a day. She is also taking atorvastatin and diclofenac. The diclofenac prescription, last filled on 05/22/2024 with two refills, is due for renewal on 08/22/2024. She is advised to notify the clinic when she needs a refill.    4. Weight Management.  She reported a weight of 193 lbs, down from 201 lbs. She attributes this to reduced appetite and avoiding fast food. She is encouraged to continue these healthy eating habits.    5. Health Maintenance.  A comprehensive blood workup is overdue and will be conducted at a later date. She is advised to contact her rheumatologist to ensure the necessary labs are faxed to the clinic.          Follow Up:   No follow-ups on file.    Kalen Leslie  [facility] [place]     Patient or patient representative verbalized consent for the use of Ambient Listening during the visit with  Kalen Leslie MD for chart documentation. 8/21/2024  12:54 EDT

## 2024-09-17 ENCOUNTER — TELEPHONE (OUTPATIENT)
Dept: FAMILY MEDICINE CLINIC | Facility: CLINIC | Age: 69
End: 2024-09-17

## 2024-09-17 NOTE — TELEPHONE ENCOUNTER
Caller: Katie Nicolas     Relationship: [unfilled]     Best call back number: [phone]    What is your medical concern? PT CALLED TO VERIFY IF PCP HAS RECEIVED LAB RESULTS FROM RHEUMATOLOGIST THAT WAS DONE IN [place]

## 2024-10-14 DIAGNOSIS — R05.1 ACUTE COUGH: ICD-10-CM

## 2024-10-15 RX ORDER — CODEINE PHOSPHATE AND GUAIFENESIN 10; 100 MG/5ML; MG/5ML
SOLUTION ORAL
Qty: 120 ML | OUTPATIENT
Start: 2024-10-15

## 2024-10-23 RX ORDER — OMEPRAZOLE 40 MG/1
40 CAPSULE, DELAYED RELEASE ORAL
Qty: 30 CAPSULE | Refills: 0 | Status: SHIPPED | OUTPATIENT
Start: 2024-10-23

## 2024-11-07 RX ORDER — ATORVASTATIN CALCIUM 20 MG/1
20 TABLET, FILM COATED ORAL DAILY
Qty: 90 TABLET | Refills: 0 | Status: SHIPPED | OUTPATIENT
Start: 2024-11-07

## 2024-11-19 RX ORDER — OMEPRAZOLE 40 MG/1
40 CAPSULE, DELAYED RELEASE ORAL
Qty: 30 CAPSULE | Refills: 0 | Status: SHIPPED | OUTPATIENT
Start: 2024-11-19 | End: 2024-11-21 | Stop reason: SDUPTHER

## 2024-11-21 ENCOUNTER — OFFICE VISIT (OUTPATIENT)
Dept: FAMILY MEDICINE CLINIC | Facility: CLINIC | Age: 69
End: 2024-11-21
Payer: MEDICARE

## 2024-11-21 VITALS
HEART RATE: 67 BPM | WEIGHT: 196.5 LBS | OXYGEN SATURATION: 97 % | BODY MASS INDEX: 36.16 KG/M2 | SYSTOLIC BLOOD PRESSURE: 146 MMHG | HEIGHT: 62 IN | DIASTOLIC BLOOD PRESSURE: 76 MMHG

## 2024-11-21 DIAGNOSIS — F41.9 ANXIETY: ICD-10-CM

## 2024-11-21 DIAGNOSIS — I10 PRIMARY HYPERTENSION: Primary | ICD-10-CM

## 2024-11-21 DIAGNOSIS — M19.91 PRIMARY OSTEOARTHRITIS, UNSPECIFIED SITE: ICD-10-CM

## 2024-11-21 DIAGNOSIS — K21.9 GASTROESOPHAGEAL REFLUX DISEASE WITHOUT ESOPHAGITIS: ICD-10-CM

## 2024-11-21 DIAGNOSIS — I47.29 VENTRICULAR TACHYCARDIA (PAROXYSMAL): ICD-10-CM

## 2024-11-21 DIAGNOSIS — E78.2 MIXED HYPERLIPIDEMIA: ICD-10-CM

## 2024-11-21 PROCEDURE — G0008 ADMIN INFLUENZA VIRUS VAC: HCPCS | Performed by: FAMILY MEDICINE

## 2024-11-21 PROCEDURE — 90662 IIV NO PRSV INCREASED AG IM: CPT | Performed by: FAMILY MEDICINE

## 2024-11-21 PROCEDURE — 99214 OFFICE O/P EST MOD 30 MIN: CPT | Performed by: FAMILY MEDICINE

## 2024-11-21 PROCEDURE — 3078F DIAST BP <80 MM HG: CPT | Performed by: FAMILY MEDICINE

## 2024-11-21 PROCEDURE — 3077F SYST BP >= 140 MM HG: CPT | Performed by: FAMILY MEDICINE

## 2024-11-21 PROCEDURE — 1125F AMNT PAIN NOTED PAIN PRSNT: CPT | Performed by: FAMILY MEDICINE

## 2024-11-21 RX ORDER — CLONAZEPAM 1 MG/1
1 TABLET ORAL 2 TIMES DAILY PRN
Qty: 60 TABLET | Refills: 2 | Status: SHIPPED | OUTPATIENT
Start: 2024-11-21

## 2024-11-21 RX ORDER — OMEPRAZOLE 40 MG/1
40 CAPSULE, DELAYED RELEASE ORAL DAILY
Qty: 90 CAPSULE | Refills: 3 | Status: SHIPPED | OUTPATIENT
Start: 2024-11-21

## 2024-11-21 RX ORDER — METOPROLOL TARTRATE 50 MG
50 TABLET ORAL 2 TIMES DAILY
Qty: 180 TABLET | Refills: 1 | Status: SHIPPED | OUTPATIENT
Start: 2024-11-21

## 2024-11-21 RX ORDER — ATORVASTATIN CALCIUM 20 MG/1
20 TABLET, FILM COATED ORAL DAILY
Qty: 90 TABLET | Refills: 1 | Status: SHIPPED | OUTPATIENT
Start: 2024-11-21

## 2024-11-21 RX ORDER — VALSARTAN 160 MG/1
160 TABLET ORAL DAILY
Qty: 90 TABLET | Refills: 1 | Status: SHIPPED | OUTPATIENT
Start: 2024-11-21

## 2024-11-21 NOTE — PROGRESS NOTES
Follow Up Office Visit      Date of Visit:  2024   Patient Name: Katie Nicolas  : 1955   MRN: [id number]     Chief Complaint:    Chief Complaint   Patient presents with    Anxiety       History of Present Illness: Katie Nicolas is a 69 y.o. female who is here today for follow up.    History of Present Illness  The patient is a 69-year-old female here for a recheck on her chronic medications.    She reports no current health issues. She is seeking refills for her clonazepam prescription. She is currently taking valsartan for blood pressure management and atorvastatin for cholesterol control. She is curious about the effectiveness of atorvastatin.    She recently traveled to [place], where she experienced some lower back discomfort due to frequent stair climbing. This was managed with prednisone. She has no immediate travel plans.    She had blood work done at [facility] last week. She is scheduled for a colonoscopy on 2024 due to a history of polyps and is also due for a mammogram. She is interested in receiving the influenza vaccine.    She is currently undergoing therapy due to the recent loss of her nephew and family issues. She has had two therapy sessions so far, which have been beneficial. She is also responsible for caring for her mother.    Her next appointment with Dr. Joe is not until the new . She has an upcoming appointment with Dr. Knapp on 2024.      Subjective      Review of Systems:   Review of Systems   Constitutional:  Negative for fatigue and fever.   HENT:  Negative for congestion and ear pain.    Respiratory:  Negative for apnea, cough, chest tightness and shortness of breath.    Cardiovascular:  Negative for chest pain.   Gastrointestinal:  Negative for abdominal pain, constipation, diarrhea and nausea.   Musculoskeletal:  Negative for arthralgias.   Psychiatric/Behavioral:  Positive for depressed mood. Negative for stress. The patient is nervous/anxious.         Past Medical History:   Past Medical History:   Diagnosis Date    Anxiety     Benign essential hypertension     Chest pain, unspecified     Depressive disorder     Diverticulosis     noted on CT scan for Kidney stones    GERD without esophagitis     Hyperlipidemia, unspecified     Kidney stones     other episodes too    Paroxysmal supraventricular tachycardia     bypass tract ablation    Rheumatoid arthritis     meds    Sleep apnea 2006    cpap    Ventricular tachycardia     The patient visits the office for an evaluationof ventricular tachcardia. Additional  information: No complaints, s/p ablation.       Past Surgical History:   Past Surgical History:   Procedure Laterality Date     SECTION         Family History:   Family History   Problem Relation Age of Onset    Heart disease Father     Obesity Other        Social History:   Social History     Socioeconomic History    Marital status: Single   Tobacco Use    Smoking status: Never    Smokeless tobacco: Never   Vaping Use    Vaping status: Never Used   Substance and Sexual Activity    Alcohol use: Yes     Comment: monthly    Drug use: Never    Sexual activity: Defer       Medications:     Current Outpatient Medications:     atorvastatin (LIPITOR) 20 MG tablet, Take 1 tablet by mouth Daily., Disp: 90 tablet, Rfl: 1    clonazePAM (KlonoPIN) 1 MG tablet, Take 1 tablet by mouth 2 (Two) Times a Day As Needed for Anxiety. Take two tablets by oral route daily at bedtime, Disp: 60 tablet, Rfl: 2    diclofenac (VOLTAREN) 50 MG EC tablet, Take 1 tablet by mouth 2 (Two) Times a Day., Disp: 180 tablet, Rfl: 1    metoprolol tartrate (LOPRESSOR) 50 MG tablet, Take 1 tablet by mouth 2 (Two) Times a Day., Disp: 180 tablet, Rfl: 1    omeprazole (priLOSEC) 40 MG capsule, Take 1 capsule by mouth Daily., Disp: 90 capsule, Rfl: 3    valsartan (DIOVAN) 160 MG tablet, Take 1 tablet by mouth Daily., Disp: 90 tablet, Rfl: 1    B Complex Vitamins (Vitamin B Complex)  "tablet, Take  by mouth Daily. Take one tablet by oral route daily, Disp: , Rfl:     cholecalciferol (VITAMIN D3) 10 MCG (400 UNIT) tablet, Take 1 tablet by mouth Daily., Disp: , Rfl:     coenzyme Q10 100 MG capsule, Take 2 capsules by mouth Daily., Disp: , Rfl:     diphenhydrAMINE-acetaminophen (TYLENOL PM)  MG tablet per tablet, Take 1 tablet by mouth At Night As Needed for Sleep., Disp: , Rfl:     Magnesium 100 MG tablet, Take 100 mg by mouth Daily., Disp: , Rfl:     Melatonin 200 MCG tablet, Take  by mouth., Disp: , Rfl:     MILK THISTLE PO, Take  by mouth., Disp: , Rfl:     Restasis 0.05 % ophthalmic emulsion, Apply 1 drop to eye(s) as directed by provider Every 12 (Twelve) Hours., Disp: , Rfl:     Tofacitinib Citrate ER (Xeljanz XR) 11 MG tablet sustained-release 24 hour,  mg Tab, Oral, Daily, 0 Refill(s), Disp: , Rfl:     Allergies:   Allergies   Allergen Reactions    Niacin Nausea And Vomiting       Objective     Physical Exam:  Vital Signs:   Vitals:    11/21/24 1048   BP: 146/76   Pulse: 67   SpO2: 97%   Weight: 89.1 kg (196 lb 8 oz)   Height: 157.5 cm (62\")     Body mass index is 35.94 kg/m².     Physical Exam  Vitals and nursing note reviewed.   Constitutional:       General: She is not in acute distress.     Appearance: Normal appearance. She is not ill-appearing.   HENT:      Head: Normocephalic and atraumatic.      Right Ear: Tympanic membrane and ear canal normal.      Left Ear: Tympanic membrane and ear canal normal.      Nose: Nose normal.   Cardiovascular:      Rate and Rhythm: Normal rate and regular rhythm.      Heart sounds: Normal heart sounds.   Pulmonary:      Effort: Pulmonary effort is normal.      Breath sounds: Normal breath sounds.   Neurological:      Mental Status: She is alert and oriented to person, place, and time. Mental status is at baseline.   Psychiatric:         Mood and Affect: Mood normal.       Physical Exam      Procedures    Results    Assessment / Plan  "     Assessment/Plan:   Diagnoses and all orders for this visit:    1. Primary hypertension (Primary)  -     valsartan (DIOVAN) 160 MG tablet; Take 1 tablet by mouth Daily.  Dispense: 90 tablet; Refill: 1  -     metoprolol tartrate (LOPRESSOR) 50 MG tablet; Take 1 tablet by mouth 2 (Two) Times a Day.  Dispense: 180 tablet; Refill: 1    2. Ventricular tachycardia (paroxysmal)  -     metoprolol tartrate (LOPRESSOR) 50 MG tablet; Take 1 tablet by mouth 2 (Two) Times a Day.  Dispense: 180 tablet; Refill: 1    3. Anxiety  -     clonazePAM (KlonoPIN) 1 MG tablet; Take 1 tablet by mouth 2 (Two) Times a Day As Needed for Anxiety. Take two tablets by oral route daily at bedtime  Dispense: 60 tablet; Refill: 2    4. Gastroesophageal reflux disease without esophagitis  -     omeprazole (priLOSEC) 40 MG capsule; Take 1 capsule by mouth Daily.  Dispense: 90 capsule; Refill: 3    5. Primary osteoarthritis, unspecified site  -     diclofenac (VOLTAREN) 50 MG EC tablet; Take 1 tablet by mouth 2 (Two) Times a Day.  Dispense: 180 tablet; Refill: 1    6. Mixed hyperlipidemia  -     atorvastatin (LIPITOR) 20 MG tablet; Take 1 tablet by mouth Daily.  Dispense: 90 tablet; Refill: 1    Other orders  -     Fluzone High-Dose 65+yrs ([id number])       Assessment & Plan  1. Medication Management.  Her previous blood work from the summer was satisfactory. She is current with her pneumonia and tetanus vaccinations, and her colon cancer screening is up-to-date. Her next appointment with cardiology is scheduled for 06/10/2024. She was advised to receive the shingles vaccine, pending approval from her rheumatologist due to her Xeljanz medication. Her valsartan and metoprolol prescriptions were refilled. A 90-day supply of Klonopin was provided. Her omeprazole prescription was refilled. Diclofenac was refilled for arthritis management. Atorvastatin was refilled for cholesterol control. Blood work will be ordered to monitor her cholesterol and thyroid  levels. She will receive the influenza vaccine today.          Follow Up:   No follow-ups on file.    Kalen Leslie  [facility] [place]     Patient or patient representative verbalized consent for the use of Ambient Listening during the visit with  Kalen Leslie MD for chart documentation. 11/21/2024  12:56 EST

## 2024-12-22 DIAGNOSIS — K21.9 GASTROESOPHAGEAL REFLUX DISEASE WITHOUT ESOPHAGITIS: ICD-10-CM

## 2024-12-23 RX ORDER — OMEPRAZOLE 40 MG/1
40 CAPSULE, DELAYED RELEASE ORAL
Qty: 30 CAPSULE | OUTPATIENT
Start: 2024-12-23

## 2025-02-21 ENCOUNTER — TELEPHONE (OUTPATIENT)
Dept: FAMILY MEDICINE CLINIC | Facility: CLINIC | Age: 70
End: 2025-02-21

## 2025-02-21 ENCOUNTER — OFFICE VISIT (OUTPATIENT)
Dept: FAMILY MEDICINE CLINIC | Facility: CLINIC | Age: 70
End: 2025-02-21
Payer: MEDICARE

## 2025-02-21 VITALS
WEIGHT: 197 LBS | OXYGEN SATURATION: 97 % | DIASTOLIC BLOOD PRESSURE: 80 MMHG | HEIGHT: 62 IN | SYSTOLIC BLOOD PRESSURE: 140 MMHG | HEART RATE: 86 BPM | BODY MASS INDEX: 36.25 KG/M2

## 2025-02-21 DIAGNOSIS — I10 PRIMARY HYPERTENSION: ICD-10-CM

## 2025-02-21 DIAGNOSIS — K21.9 GASTROESOPHAGEAL REFLUX DISEASE WITHOUT ESOPHAGITIS: ICD-10-CM

## 2025-02-21 DIAGNOSIS — Z79.899 HIGH RISK MEDICATION USE: Chronic | ICD-10-CM

## 2025-02-21 DIAGNOSIS — M19.91 PRIMARY OSTEOARTHRITIS, UNSPECIFIED SITE: ICD-10-CM

## 2025-02-21 DIAGNOSIS — F41.9 ANXIETY: ICD-10-CM

## 2025-02-21 DIAGNOSIS — F41.9 ANXIETY: Primary | ICD-10-CM

## 2025-02-21 PROCEDURE — 3077F SYST BP >= 140 MM HG: CPT | Performed by: FAMILY MEDICINE

## 2025-02-21 PROCEDURE — 3079F DIAST BP 80-89 MM HG: CPT | Performed by: FAMILY MEDICINE

## 2025-02-21 PROCEDURE — 99214 OFFICE O/P EST MOD 30 MIN: CPT | Performed by: FAMILY MEDICINE

## 2025-02-21 PROCEDURE — 1125F AMNT PAIN NOTED PAIN PRSNT: CPT | Performed by: FAMILY MEDICINE

## 2025-02-21 RX ORDER — CLONAZEPAM 1 MG/1
2 TABLET ORAL NIGHTLY PRN
Qty: 60 TABLET | Refills: 2 | Status: SHIPPED | OUTPATIENT
Start: 2025-02-21 | End: 2025-02-23 | Stop reason: SDUPTHER

## 2025-02-21 NOTE — TELEPHONE ENCOUNTER
[facility] pharmacy called needing clarification on the instructions for Klonopin. The instructions say to take 2 tablets at night as needed for anxiety, then take 2 tablets at bedtime. Can we please change the instructions on this so the patient can  her prescription?

## 2025-02-23 RX ORDER — CLONAZEPAM 1 MG/1
2 TABLET ORAL NIGHTLY PRN
Qty: 60 TABLET | Refills: 2 | Status: SHIPPED | OUTPATIENT
Start: 2025-02-23

## 2025-02-23 NOTE — PROGRESS NOTES
Follow Up Office Visit      Date of Visit:  2025   Patient Name: Katie Nicolas  : 1955   MRN: [id number]     Chief Complaint:    Chief Complaint   Patient presents with    Hypertension    Anxiety       History of Present Illness: Katie Nicolas is a 69 y.o. female who is here today for follow up.    History of Present Illness  The patient presents for evaluation of anxiety, arthritis, hypertension, acid reflux, and health maintenance.    She has been managing her anxiety with clonazepam, taking 2 tablets at bedtime. She reports no shortage of medication as she occasionally reduces the dosage to 1 tablet. She has recently resumed therapy sessions, with the last session being 2 weeks ago.    She is currently on diclofenac for arthritis and inflammation, with a sufficient supply until May 2024. She is due for a TB test as part of her arthritis medication regimen.    She is also on metoprolol and valsartan for heart and blood pressure management, with a supply lasting until May 2024.    For acid reflux, she is taking omeprazole, which she has a year's supply of.    She has completed the initial 3 doses of the COVID-19 vaccine but has not received the fourth dose. She has not received the shingles vaccine. She has not taken anything for RSV and is not around anybody.    MEDICATIONS  Current: Clonazepam, diclofenac, metoprolol, omeprazole, valsartan    IMMUNIZATIONS  She has received the Prevnar 20 vaccine. She is up to date with her tetanus shot for 2 more years. She has received 3 doses of the COVID-19 vaccine.      Subjective      Review of Systems:   Review of Systems    Past Medical History:   Past Medical History:   Diagnosis Date    Anxiety     Benign essential hypertension     Chest pain, unspecified     Depressive disorder     Diverticulosis     noted on CT scan for Kidney stones    GERD without esophagitis     Hyperlipidemia, unspecified     Kidney stones     other episodes too     Paroxysmal supraventricular tachycardia     bypass tract ablation    Rheumatoid arthritis     meds    Sleep apnea 2006    cpap    Ventricular tachycardia     The patient visits the office for an evaluationof ventricular tachcardia. Additional  information: No complaints, s/p ablation.       Past Surgical History:   Past Surgical History:   Procedure Laterality Date     SECTION         Family History:   Family History   Problem Relation Age of Onset    Heart disease Father     Obesity Other        Social History:   Social History     Socioeconomic History    Marital status: Single   Tobacco Use    Smoking status: Never    Smokeless tobacco: Never   Vaping Use    Vaping status: Never Used   Substance and Sexual Activity    Alcohol use: Yes     Comment: monthly    Drug use: Never    Sexual activity: Defer       Medications:     Current Outpatient Medications:     clonazePAM (KlonoPIN) 1 MG tablet, Take 2 tablets by mouth At Night As Needed for Anxiety. Take two tablets by oral route daily at bedtime, Disp: 60 tablet, Rfl: 2    atorvastatin (LIPITOR) 20 MG tablet, Take 1 tablet by mouth Daily., Disp: 90 tablet, Rfl: 1    B Complex Vitamins (Vitamin B Complex) tablet, Take  by mouth Daily. Take one tablet by oral route daily, Disp: , Rfl:     cholecalciferol (VITAMIN D3) 10 MCG (400 UNIT) tablet, Take 1 tablet by mouth Daily., Disp: , Rfl:     coenzyme Q10 100 MG capsule, Take 2 capsules by mouth Daily., Disp: , Rfl:     diclofenac (VOLTAREN) 50 MG EC tablet, Take 1 tablet by mouth 2 (Two) Times a Day., Disp: 180 tablet, Rfl: 1    diphenhydrAMINE-acetaminophen (TYLENOL PM)  MG tablet per tablet, Take 1 tablet by mouth At Night As Needed for Sleep., Disp: , Rfl:     Magnesium 100 MG tablet, Take 100 mg by mouth Daily., Disp: , Rfl:     Melatonin 200 MCG tablet, Take  by mouth., Disp: , Rfl:     metoprolol tartrate (LOPRESSOR) 50 MG tablet, Take 1 tablet by mouth 2 (Two) Times a Day., Disp: 180 tablet, Rfl:  "1    MILK THISTLE PO, Take  by mouth., Disp: , Rfl:     omeprazole (priLOSEC) 40 MG capsule, Take 1 capsule by mouth Daily., Disp: 90 capsule, Rfl: 3    Restasis 0.05 % ophthalmic emulsion, Apply 1 drop to eye(s) as directed by provider Every 12 (Twelve) Hours., Disp: , Rfl:     Tofacitinib Citrate ER (Xeljanz XR) 11 MG tablet sustained-release 24 hour,  mg Tab, Oral, Daily, 0 Refill(s), Disp: , Rfl:     valsartan (DIOVAN) 160 MG tablet, Take 1 tablet by mouth Daily., Disp: 90 tablet, Rfl: 1    Allergies:   Allergies   Allergen Reactions    Niacin Nausea And Vomiting       Objective     Physical Exam:  Vital Signs:   Vitals:    02/21/25 1039   BP: 140/80   Pulse: 86   SpO2: 97%   Weight: 89.4 kg (197 lb)   Height: 157.5 cm (62\")     Body mass index is 36.03 kg/m².     Physical Exam  Vitals and nursing note reviewed.   Constitutional:       General: She is not in acute distress.     Appearance: Normal appearance. She is not ill-appearing.   HENT:      Head: Normocephalic and atraumatic.      Right Ear: Tympanic membrane and ear canal normal.      Left Ear: Tympanic membrane and ear canal normal.      Nose: Nose normal.   Cardiovascular:      Rate and Rhythm: Normal rate and regular rhythm.      Heart sounds: Normal heart sounds.   Pulmonary:      Effort: Pulmonary effort is normal.      Breath sounds: Normal breath sounds.   Neurological:      Mental Status: She is alert and oriented to person, place, and time. Mental status is at baseline.   Psychiatric:         Mood and Affect: Mood normal.       Physical Exam      Procedures    Results  Laboratory Studies  Blood counts were normal. Blood sugar was slightly above normal. Kidney function was good. Electrolytes like sodium, potassium, chloride, calcium are good. Liver tests were good. Triglycerides could be a little better. LDL cholesterol was good. Inflammatory marker was okay.    Imaging  Colonoscopy showed tubular adenomas.  Assessment / Plan      Assessment/Plan: "   Diagnoses and all orders for this visit:    1. Anxiety (Primary)  -     clonazePAM (KlonoPIN) 1 MG tablet; Take 2 tablets by mouth At Night As Needed for Anxiety. Take two tablets by oral route daily at bedtime  Dispense: 60 tablet; Refill: 2    2. High risk medication use  -     QuantiFERON TB Gold    3. Primary osteoarthritis, unspecified site    4. Primary hypertension    5. Gastroesophageal reflux disease without esophagitis       Assessment & Plan  1. Anxiety.  She is currently taking clonazepam, 2 pills at bedtime, and occasionally 1 pill during the day as needed. A prescription refill for clonazepam has been provided.    2. Arthritis.  She is taking diclofenac for arthritis and inflammation, with a supply sufficient until May 2025. A QuantiFERON-TB Gold test has been ordered today to comply with her rheumatologist's requirement for TB testing.    3. Hypertension.  She is taking metoprolol and valsartan for blood pressure management, with a supply sufficient until May 2025.    4. Acid reflux.  She is taking omeprazole for acid reflux, with a supply sufficient for a year.    5. Health maintenance.  Her recent colonoscopy revealed tubular adenomas, necessitating a follow-up in 3 years. She is current with her colon cancer screening. Her mammogram was conducted in October 2024. She has received the Prevnar 20 vaccine, which provides lifelong protection against pneumonia. She is up to date with her tetanus shot for the next 2 years. She has completed the initial 3 doses of the COVID-19 vaccine but has not received the fourth dose. She has not received the shingles vaccine. She is not a high-risk individual for RSV as she does not have COPD. She is advised to receive her influenza vaccine in the fall of 2025. A bone density test is recommended to be scheduled concurrently with her next mammogram. She is encouraged to consider the shingles vaccine, which can be administered at our clinic or a  pharmacy.    PROCEDURE  Colonoscopy showed tubular adenomas.        Follow Up:   No follow-ups on file.    Kalen Leslie  [facility] [place]     Patient or patient representative verbalized consent for the use of Ambient Listening during the visit with  Kalen Leslie MD for chart documentation. 2/23/2025  12:07 EST

## 2025-02-25 LAB
GAMMA INTERFERON BACKGROUND BLD IA-ACNC: 0.01 IU/ML
M TB IFN-G BLD-IMP: NEGATIVE
M TB IFN-G CD4+ BCKGRND COR BLD-ACNC: 0 IU/ML
M TB IFN-G CD4+CD8+ BCKGRND COR BLD-ACNC: 0 IU/ML
MITOGEN IGNF BCKGRD COR BLD-ACNC: 3 IU/ML
QUANTIFERON INCUBATION: NORMAL
SERVICE CMNT-IMP: NORMAL

## 2025-04-29 ENCOUNTER — TELEPHONE (OUTPATIENT)
Dept: ONCOLOGY | Facility: CLINIC | Age: 70
End: 2025-04-29

## 2025-04-29 NOTE — TELEPHONE ENCOUNTER
Caller: Katie Nicolas    Relationship: Self    Best call back number: [phone]    What is the best time to reach you: ANYTIME    Who are you requesting to speak with (clinical staff, provider,  specific staff member):     What was the call regarding: PATIENT RECVD A TEXT MENTIONING THAT SHE WOULD HAVE A ZERO COPAY TO SEE DR RIBERA. NO REFERRAL IN CHART, NOT A CURRENT PATIENT. PATIENT IS NOT SURE WHY SHE WOULD HAVE RECEIVED THIS MSG.    PLEASE CALL TO ADVISE.

## 2025-05-02 DIAGNOSIS — I10 PRIMARY HYPERTENSION: ICD-10-CM

## 2025-05-02 RX ORDER — VALSARTAN 160 MG/1
160 TABLET ORAL DAILY
Qty: 30 TABLET | Refills: 0 | Status: SHIPPED | OUTPATIENT
Start: 2025-05-02

## 2025-05-21 ENCOUNTER — OFFICE VISIT (OUTPATIENT)
Dept: FAMILY MEDICINE CLINIC | Facility: CLINIC | Age: 70
End: 2025-05-21
Payer: MEDICARE

## 2025-05-21 VITALS
HEART RATE: 65 BPM | SYSTOLIC BLOOD PRESSURE: 136 MMHG | DIASTOLIC BLOOD PRESSURE: 82 MMHG | WEIGHT: 197.9 LBS | BODY MASS INDEX: 35.07 KG/M2 | HEIGHT: 63 IN | OXYGEN SATURATION: 97 %

## 2025-05-21 DIAGNOSIS — I47.20 VENTRICULAR TACHYCARDIA (PAROXYSMAL): ICD-10-CM

## 2025-05-21 DIAGNOSIS — I10 PRIMARY HYPERTENSION: ICD-10-CM

## 2025-05-21 DIAGNOSIS — M19.91 PRIMARY OSTEOARTHRITIS, UNSPECIFIED SITE: ICD-10-CM

## 2025-05-21 DIAGNOSIS — F41.9 ANXIETY: ICD-10-CM

## 2025-05-21 DIAGNOSIS — E78.2 MIXED HYPERLIPIDEMIA: ICD-10-CM

## 2025-05-21 PROCEDURE — 1125F AMNT PAIN NOTED PAIN PRSNT: CPT | Performed by: FAMILY MEDICINE

## 2025-05-21 PROCEDURE — 99214 OFFICE O/P EST MOD 30 MIN: CPT | Performed by: FAMILY MEDICINE

## 2025-05-21 PROCEDURE — 3079F DIAST BP 80-89 MM HG: CPT | Performed by: FAMILY MEDICINE

## 2025-05-21 PROCEDURE — 3075F SYST BP GE 130 - 139MM HG: CPT | Performed by: FAMILY MEDICINE

## 2025-05-21 RX ORDER — CLONAZEPAM 1 MG/1
2 TABLET ORAL NIGHTLY PRN
Qty: 60 TABLET | Refills: 2 | Status: SHIPPED | OUTPATIENT
Start: 2025-05-21

## 2025-05-21 RX ORDER — ATORVASTATIN CALCIUM 20 MG/1
20 TABLET, FILM COATED ORAL DAILY
Qty: 90 TABLET | Refills: 1 | Status: SHIPPED | OUTPATIENT
Start: 2025-05-21

## 2025-05-21 RX ORDER — METOPROLOL TARTRATE 50 MG
50 TABLET ORAL 2 TIMES DAILY
Qty: 180 TABLET | Refills: 1 | Status: SHIPPED | OUTPATIENT
Start: 2025-05-21

## 2025-05-21 RX ORDER — VALSARTAN 160 MG/1
160 TABLET ORAL DAILY
Qty: 90 TABLET | Refills: 1 | Status: SHIPPED | OUTPATIENT
Start: 2025-05-21

## 2025-05-21 NOTE — PROGRESS NOTES
Follow Up Office Visit      Date of Visit:  2025   Patient Name: Katie Nicolas  : 1955   MRN: [id number]     Chief Complaint:    Chief Complaint   Patient presents with    Hypertension       History of Present Illness: Katie Nicolas is a 69 y.o. female who is here today for follow up.    History of Present Illness  The patient presents for evaluation of lower back pain, hypertension, and dry eyes.    She continues to experience significant lower back pain, which is particularly pronounced upon waking each morning. She describes having to walk in a hunched position initially but finds some relief after performing stretching exercises. She takes diclofenac as needed for pain management. She acknowledges a recent weight gain and attributes it partly to the ongoing pain and situational grief.    Her blood pressure readings have shown improvement compared to the previous two visits. She is currently on valsartan for hypertension.    She has been using Restasis for dry eye syndrome for several years. Recently, she was informed by [employer] that a change in medication is necessary, although the reason was not disclosed.    She received a TB test during her last visit to the rheumatologist and will be visiting them next month. She will get a printout of her labs when she goes there next month.      Subjective      Review of Systems:   Review of Systems    Past Medical History:   Past Medical History:   Diagnosis Date    Anxiety     Benign essential hypertension     Chest pain, unspecified     Depressive disorder     Diverticulosis     noted on CT scan for Kidney stones    GERD without esophagitis     Hyperlipidemia, unspecified     Kidney stones 2005    other episodes too    Paroxysmal supraventricular tachycardia     bypass tract ablation    Rheumatoid arthritis     meds    Sleep apnea 2006    cpap    Ventricular tachycardia     The patient visits the office for an evaluationof ventricular tachcardia.  Additional  information: No complaints, s/p ablation.       Past Surgical History:   Past Surgical History:   Procedure Laterality Date     SECTION         Family History:   Family History   Problem Relation Age of Onset    Heart disease Father     Obesity Other        Social History:   Social History     Socioeconomic History    Marital status: Single   Tobacco Use    Smoking status: Never    Smokeless tobacco: Never   Vaping Use    Vaping status: Never Used   Substance and Sexual Activity    Alcohol use: Yes     Comment: monthly    Drug use: Never    Sexual activity: Defer       Medications:     Current Outpatient Medications:     atorvastatin (LIPITOR) 20 MG tablet, Take 1 tablet by mouth Daily., Disp: 90 tablet, Rfl: 1    B Complex Vitamins (Vitamin B Complex) tablet, Take  by mouth Daily. Take one tablet by oral route daily, Disp: , Rfl:     cholecalciferol (VITAMIN D3) 10 MCG (400 UNIT) tablet, Take 1 tablet by mouth Daily., Disp: , Rfl:     clonazePAM (KlonoPIN) 1 MG tablet, Take 2 tablets by mouth At Night As Needed for Anxiety., Disp: 60 tablet, Rfl: 2    coenzyme Q10 100 MG capsule, Take 2 capsules by mouth Daily., Disp: , Rfl:     diclofenac (VOLTAREN) 50 MG EC tablet, Take 1 tablet by mouth 2 (Two) Times a Day., Disp: 180 tablet, Rfl: 1    Magnesium 100 MG tablet, Take 100 mg by mouth Daily., Disp: , Rfl:     Melatonin 200 MCG tablet, Take  by mouth., Disp: , Rfl:     metoprolol tartrate (LOPRESSOR) 50 MG tablet, Take 1 tablet by mouth 2 (Two) Times a Day., Disp: 180 tablet, Rfl: 1    MILK THISTLE PO, Take  by mouth., Disp: , Rfl:     omeprazole (priLOSEC) 40 MG capsule, Take 1 capsule by mouth Daily., Disp: 90 capsule, Rfl: 3    Restasis 0.05 % ophthalmic emulsion, Apply 1 drop to eye(s) as directed by provider Every 12 (Twelve) Hours., Disp: , Rfl:     Tofacitinib Citrate ER (Xeljanz XR) 11 MG tablet sustained-release 24 hour,  mg Tab, Oral, Daily, 0 Refill(s), Disp: , Rfl:     valsartan  "(DIOVAN) 160 MG tablet, Take 1 tablet by mouth Daily., Disp: 90 tablet, Rfl: 1    diphenhydrAMINE-acetaminophen (TYLENOL PM)  MG tablet per tablet, Take 1 tablet by mouth At Night As Needed for Sleep. (Patient not taking: Reported on 5/21/2025), Disp: , Rfl:     Allergies:   Allergies   Allergen Reactions    Niacin Nausea And Vomiting       Objective     Physical Exam:  Vital Signs:   Vitals:    05/21/25 1101   BP: 136/82   BP Location: Left arm   Patient Position: Sitting   Cuff Size: Adult   Pulse: 65   SpO2: 97%   Weight: 89.8 kg (197 lb 14.4 oz)   Height: 160 cm (63\")     Body mass index is 35.06 kg/m².     Physical Exam  Vitals and nursing note reviewed.   Constitutional:       General: She is not in acute distress.     Appearance: Normal appearance. She is not ill-appearing.   HENT:      Head: Normocephalic and atraumatic.      Right Ear: Tympanic membrane and ear canal normal.      Left Ear: Tympanic membrane and ear canal normal.      Nose: Nose normal.   Cardiovascular:      Rate and Rhythm: Normal rate and regular rhythm.      Heart sounds: Normal heart sounds.   Pulmonary:      Effort: Pulmonary effort is normal.      Breath sounds: Normal breath sounds.   Neurological:      Mental Status: She is alert and oriented to person, place, and time. Mental status is at baseline.   Psychiatric:         Mood and Affect: Mood normal.       Physical Exam      Procedures    Results    Assessment / Plan      Assessment/Plan:   Diagnoses and all orders for this visit:    1. Primary hypertension  -     valsartan (DIOVAN) 160 MG tablet; Take 1 tablet by mouth Daily.  Dispense: 90 tablet; Refill: 1  -     metoprolol tartrate (LOPRESSOR) 50 MG tablet; Take 1 tablet by mouth 2 (Two) Times a Day.  Dispense: 180 tablet; Refill: 1    2. Mixed hyperlipidemia  -     atorvastatin (LIPITOR) 20 MG tablet; Take 1 tablet by mouth Daily.  Dispense: 90 tablet; Refill: 1    3. Primary osteoarthritis, unspecified site  -     " diclofenac (VOLTAREN) 50 MG EC tablet; Take 1 tablet by mouth 2 (Two) Times a Day.  Dispense: 180 tablet; Refill: 1    4. Ventricular tachycardia (paroxysmal)  -     metoprolol tartrate (LOPRESSOR) 50 MG tablet; Take 1 tablet by mouth 2 (Two) Times a Day.  Dispense: 180 tablet; Refill: 1    5. Anxiety  -     clonazePAM (KlonoPIN) 1 MG tablet; Take 2 tablets by mouth At Night As Needed for Anxiety.  Dispense: 60 tablet; Refill: 2       Assessment & Plan  1. Lower back pain.  - Reports persistent lower back pain, especially in the mornings, which improves slightly with stretching exercises.  - Uses diclofenac as needed for pain management.  - Advised to continue daily stretching exercises and to engage in physical activities such as walking outdoors to strengthen her back.  - Weight loss is recommended to alleviate back pain.    2. Hypertension.  - Blood pressure readings have improved compared to previous visits.  - Currently on valsartan.  - Prescription for a 90-day supply of valsartan will be provided.    3. Dry eyes.  - Has been using Restasis for dry eyes.  - [employer] has indicated that the medication will need to be changed to another one, likely due to formulary changes.  - Will be informed of the new medication once it is determined.    4. Health maintenance.  - Completed all pneumonia shots and is up to date with tetanus shot until 2027.  - Last mammogram and influenza vaccine were administered in the fall of 2024, and colon cancer screening was conducted in the fall of 2024.  - Advised to consider receiving the shingles vaccine.  - Advised to provide a copy of recent blood work results from her rheumatologist.        Follow Up:   No follow-ups on file.    Kalen Leslie  [facility]     Patient or patient representative verbalized consent for the use of Ambient Listening during the visit with  Kalen Leslie MD for chart documentation. 5/21/2025  12:11 EDT

## 2025-06-10 ENCOUNTER — OFFICE VISIT (OUTPATIENT)
Dept: CARDIOLOGY | Facility: CLINIC | Age: 70
End: 2025-06-10
Payer: MEDICARE

## 2025-06-10 VITALS
OXYGEN SATURATION: 96 % | HEIGHT: 63 IN | HEART RATE: 88 BPM | RESPIRATION RATE: 18 BRPM | WEIGHT: 196 LBS | SYSTOLIC BLOOD PRESSURE: 92 MMHG | DIASTOLIC BLOOD PRESSURE: 64 MMHG | BODY MASS INDEX: 34.73 KG/M2

## 2025-06-10 DIAGNOSIS — I10 PRIMARY HYPERTENSION: Primary | ICD-10-CM

## 2025-06-10 DIAGNOSIS — I47.20 VENTRICULAR TACHYCARDIA (PAROXYSMAL): ICD-10-CM

## 2025-06-10 DIAGNOSIS — E78.5 HYPERLIPIDEMIA LDL GOAL <100: ICD-10-CM

## 2025-06-10 PROCEDURE — 3078F DIAST BP <80 MM HG: CPT | Performed by: INTERNAL MEDICINE

## 2025-06-10 PROCEDURE — 3074F SYST BP LT 130 MM HG: CPT | Performed by: INTERNAL MEDICINE

## 2025-06-10 PROCEDURE — 1160F RVW MEDS BY RX/DR IN RCRD: CPT | Performed by: INTERNAL MEDICINE

## 2025-06-10 PROCEDURE — 1159F MED LIST DOCD IN RCRD: CPT | Performed by: INTERNAL MEDICINE

## 2025-06-10 PROCEDURE — 99214 OFFICE O/P EST MOD 30 MIN: CPT | Performed by: INTERNAL MEDICINE

## 2025-06-10 PROCEDURE — 93000 ELECTROCARDIOGRAM COMPLETE: CPT | Performed by: INTERNAL MEDICINE

## 2025-06-11 ENCOUNTER — TELEPHONE (OUTPATIENT)
Dept: CARDIOLOGY | Facility: CLINIC | Age: 70
End: 2025-06-11
Payer: MEDICARE

## 2025-06-11 NOTE — TELEPHONE ENCOUNTER
Left a message for Ms. Nicolas to call back tomorrow to confirm she got this message and speak with Pippa.  Dr. Dumont was able to talk with the nurse practitioner that saw you at [facility] and they state they were going by another doctor stating you had a history of A-fib.  With your confusion they went ahead and started you on the Eliquis since they could not confirm 1 where the other about the A-fib.  They have no time ever saw A-fib while you are in the hospital stay and you do not need the Eliquis again please call me tomorrow to confirm you got this message thanks so much.

## 2025-06-11 NOTE — PROGRESS NOTES
[facility]  [facility]  [address] [place] [address]  Dept: [phone]  Dept Fax: [phone]    Katie Nicolas  1955    Follow Up Office Visit Note    History of Present Illness:  Katie Nicolas is a 69 y.o. female who presents to the clinic for hosptial follow up. She has been at the hospital with pneumonia, ,no cardiology consult but seems she has had AF- she is not aware about it but she was told to take Eliquis 5 mg bid, she has history of VT s.p ablation, we review the records and EKG sinus rhythm, there is not clear note regarding AF when she was found it, on it, and how she came back to normal, we will try to contact the hospitalist who care for her at the hospital    The following portions of the patient's history were reviewed and updated as appropriate: allergies, current medications, past family history, past medical history, past social history, past surgical history, and problem list.    Medications:  atorvastatin  cholecalciferol  clonazePAM  coenzyme Q10  diclofenac  diphenhydrAMINE-acetaminophen tablet  Magnesium tablet  Melatonin tablet  metoprolol tartrate  MILK THISTLE PO  omeprazole  Restasis emulsion  valsartan  Vitamin B Complex tablet  Xeljanz XR tablet sustained-release 24 hour    Subjective  Allergies   Allergen Reactions   • Niacin Nausea And Vomiting        Past Medical History:   Diagnosis Date   • Anxiety    • Benign essential hypertension    • Chest pain, unspecified    • Depressive disorder    • Diverticulosis 2005    noted on CT scan for Kidney stones   • GERD without esophagitis    • Hyperlipidemia, unspecified    • Kidney stones 2005    other episodes too   • Paroxysmal supraventricular tachycardia     bypass tract ablation   • Rheumatoid arthritis     meds   • Sleep apnea 2006    cpap   • Ventricular tachycardia     The patient visits the office for an evaluationof ventricular tachcardia. Additional  information: No  "complaints, s/p ablation.       Past Surgical History:   Procedure Laterality Date   •  SECTION         Family History   Problem Relation Age of Onset   • Heart disease Father    • Obesity Other         Social History     Socioeconomic History   • Marital status: Single   Tobacco Use   • Smoking status: Never   • Smokeless tobacco: Never   Vaping Use   • Vaping status: Never Used   Substance and Sexual Activity   • Alcohol use: Yes     Comment: monthly   • Drug use: Never   • Sexual activity: Defer       Review of Systems   Constitutional: Negative.    HENT: Negative.     Respiratory: Negative.     Cardiovascular: Negative.    Endocrine: Negative.    Genitourinary: Negative.    Musculoskeletal: Negative.    Skin: Negative.    Allergic/Immunologic: Negative.    Neurological: Negative.    Hematological: Negative.    Psychiatric/Behavioral: Negative.       Cardiovascular Procedures    ECHO/MUGA:  STRESS TESTS:   CARDIAC CATH:   DEVICES:   HOLTER:   CT/MRI:   VASCULAR:   CARDIOTHORACIC:     Objective  Vitals:    06/10/25 1126   BP: 92/64   BP Location: Right arm   Patient Position: Sitting   Cuff Size: Adult   Pulse: 88   Resp: 18   SpO2: 96%   Weight: 88.9 kg (196 lb)   Height: 160 cm (63\")   PainSc: 0-No pain     Body mass index is 34.72 kg/m².     Physical Exam  Constitutional:       Appearance: Healthy appearance. Not in distress.   Neck:      Vascular: No JVR. JVD normal.   Pulmonary:      Effort: Pulmonary effort is normal.      Breath sounds: Normal breath sounds. No wheezing. No rhonchi. No rales.   Chest:      Chest wall: Not tender to palpatation.   Cardiovascular:      PMI at left midclavicular line. Normal rate. Regular rhythm. Normal S1. Normal S2.       Murmurs: There is no murmur.      No gallop.  No click. No rub.   Pulses:     Intact distal pulses.   Edema:     Peripheral edema absent.   Abdominal:      General: Bowel sounds are normal.      Palpations: Abdomen is soft.      Tenderness: There is " no abdominal tenderness.   Musculoskeletal: Normal range of motion.         General: No tenderness. Skin:     General: Skin is warm and dry.   Neurological:      General: No focal deficit present.      Mental Status: Alert and oriented to person, place and time.        Diagnostic Data    ECG 12 Lead    Date/Time: 6/10/2025 9:41 PM  Performed by: Héctor Dumont MD    Authorized by: Héctor Dumont MD  Comparison: compared with previous ECG from 6/11/2024  Similar to previous ECG  Rhythm: sinus rhythm  Rate: normal  BPM: 61  QRS axis: normal  Other findings: non-specific ST-T wave changes    Clinical impression: abnormal EKG        Assessment and Plan  Diagnoses and all orders for this visit:    Primary hypertension-BP is 90.60 will advised to hold the Valsartan likely related to recent  infection    Hyperlipidemia LDL goal <100- On Lipitor 20 mg    Ventricular tachycardia (paroxysmal)- s.,p ablation asymptomatic, .  Possible AF- will review more records         Return in about 6 months (around 12/10/2025) for Recheck with Dr. Dumont.    Héctor Dumont MD  06/10/2025